# Patient Record
Sex: FEMALE | Race: WHITE
[De-identification: names, ages, dates, MRNs, and addresses within clinical notes are randomized per-mention and may not be internally consistent; named-entity substitution may affect disease eponyms.]

---

## 2018-04-19 ENCOUNTER — HOSPITAL ENCOUNTER (INPATIENT)
Dept: HOSPITAL 91 - E/R | Age: 48
LOS: 6 days | Discharge: HOME | DRG: 444 | End: 2018-04-25
Payer: MEDICAID

## 2018-04-19 DIAGNOSIS — D61.818: ICD-10-CM

## 2018-04-19 DIAGNOSIS — L03.115: ICD-10-CM

## 2018-04-19 DIAGNOSIS — B19.20: ICD-10-CM

## 2018-04-19 DIAGNOSIS — K81.0: Primary | ICD-10-CM

## 2018-04-19 DIAGNOSIS — Z59.0: ICD-10-CM

## 2018-04-19 DIAGNOSIS — J45.901: ICD-10-CM

## 2018-04-19 DIAGNOSIS — E66.01: ICD-10-CM

## 2018-04-19 DIAGNOSIS — K65.2: ICD-10-CM

## 2018-04-19 DIAGNOSIS — G47.30: ICD-10-CM

## 2018-04-19 DIAGNOSIS — F11.20: ICD-10-CM

## 2018-04-19 DIAGNOSIS — Z76.5: ICD-10-CM

## 2018-04-19 DIAGNOSIS — L03.116: ICD-10-CM

## 2018-04-19 DIAGNOSIS — Z86.718: ICD-10-CM

## 2018-04-19 DIAGNOSIS — I89.0: ICD-10-CM

## 2018-04-19 DIAGNOSIS — K74.60: ICD-10-CM

## 2018-04-19 DIAGNOSIS — I10: ICD-10-CM

## 2018-04-19 LAB
ABNORMAL IP MESSAGE: 1
ADD MAN DIFF?: YES
ALANINE AMINOTRANSFERASE: 18 IU/L (ref 13–69)
ALBUMIN/GLOBULIN RATIO: 1.24
ALBUMIN: 3.1 G/DL (ref 3.3–4.9)
ALKALINE PHOSPHATASE: 62 IU/L (ref 42–121)
ANION GAP: 11 (ref 8–16)
ANISOCYTOSIS: (no result) (ref 0–0)
ASPARTATE AMINO TRANSFERASE: 13 IU/L (ref 15–46)
BAND NEUTROPHILS #M: 0 10^3/UL (ref 0–0.6)
BAND NEUTROPHILS % (M): 2 % (ref 0–4)
BILIRUBIN,DIRECT: 0 MG/DL (ref 0–0.2)
BILIRUBIN,TOTAL: 0.5 MG/DL (ref 0.2–1.3)
BLOOD UREA NITROGEN: 20 MG/DL (ref 7–20)
CALCIUM: 8.9 MG/DL (ref 8.4–10.2)
CARBON DIOXIDE: 27 MMOL/L (ref 21–31)
CHLORIDE: 106 MMOL/L (ref 97–110)
CREATININE: 0.76 MG/DL (ref 0.44–1)
GIANT THROMBO% (M): 2 % (ref 0–0)
GLOBULIN: 2.5 G/DL (ref 1.3–3.2)
GLUCOSE: 112 MG/DL (ref 70–220)
HEMATOCRIT: 32.5 % (ref 37–47)
HEMOGLOBIN: 10.3 G/DL (ref 12–16)
LYMPHOCYTES #M: 0.1 10^3/UL (ref 0.8–2.9)
LYMPHOCYTES % (M): 7 % (ref 15–51)
MEAN CORPUSCULAR HEMOGLOBIN: 25.5 PG (ref 29–33)
MEAN CORPUSCULAR HGB CONC: 31.7 G/DL (ref 32–37)
MEAN CORPUSCULAR VOLUME: 80.4 FL (ref 82–101)
MICROCYTOSIS: (no result) (ref 0–0)
MONOCYTE #M: 0.2 10^3/UL (ref 0.3–0.9)
MONOCYTES % (M): 10 % (ref 0–11)
NUCLEATED RED BLOOD CELLS%: 0 /100WBC (ref 0–0)
OVALOCYTES: (no result) (ref 0–0)
PATH REVIEW?: (no result)
PLATELET COUNT: 39 10^3/UL (ref 140–415)
PLATELET ESTIMATE: (no result)
POIKILOCYTOSIS: (no result) (ref 0–0)
POSITIVE DIFF: (no result)
POTASSIUM: 3.7 MMOL/L (ref 3.5–5.1)
RED BLOOD COUNT: 4.04 10^6/UL (ref 4.2–5.4)
RED CELL DISTRIBUTION WIDTH: 14.6 % (ref 11.5–14.5)
SEG NEUT #M: 1.7 10^3/UL (ref 1.6–7.5)
SEGMENTED NEUTROPHILS (M) %: 81 % (ref 39–77)
SMUDGE%M: 3 % (ref 0–0)
SODIUM: 140 MMOL/L (ref 135–144)
TOTAL PROTEIN: 5.6 G/DL (ref 6.1–8.1)
WHITE BLOOD COUNT: 2.1 10^3/UL (ref 4.8–10.8)

## 2018-04-19 PROCEDURE — 87340 HEPATITIS B SURFACE AG IA: CPT

## 2018-04-19 PROCEDURE — 83605 ASSAY OF LACTIC ACID: CPT

## 2018-04-19 PROCEDURE — 83735 ASSAY OF MAGNESIUM: CPT

## 2018-04-19 PROCEDURE — 84100 ASSAY OF PHOSPHORUS: CPT

## 2018-04-19 PROCEDURE — 96374 THER/PROPH/DIAG INJ IV PUSH: CPT

## 2018-04-19 PROCEDURE — 86644 CMV ANTIBODY: CPT

## 2018-04-19 PROCEDURE — 86945 BLOOD PRODUCT/IRRADIATION: CPT

## 2018-04-19 PROCEDURE — 85025 COMPLETE CBC W/AUTO DIFF WBC: CPT

## 2018-04-19 PROCEDURE — 81025 URINE PREGNANCY TEST: CPT

## 2018-04-19 PROCEDURE — 81001 URINALYSIS AUTO W/SCOPE: CPT

## 2018-04-19 PROCEDURE — 36430 TRANSFUSION BLD/BLD COMPNT: CPT

## 2018-04-19 PROCEDURE — 76705 ECHO EXAM OF ABDOMEN: CPT

## 2018-04-19 PROCEDURE — 86703 HIV-1/HIV-2 1 RESULT ANTBDY: CPT

## 2018-04-19 PROCEDURE — 86901 BLOOD TYPING SEROLOGIC RH(D): CPT

## 2018-04-19 PROCEDURE — 85049 AUTOMATED PLATELET COUNT: CPT

## 2018-04-19 PROCEDURE — 85730 THROMBOPLASTIN TIME PARTIAL: CPT

## 2018-04-19 PROCEDURE — 85610 PROTHROMBIN TIME: CPT

## 2018-04-19 PROCEDURE — 80048 BASIC METABOLIC PNL TOTAL CA: CPT

## 2018-04-19 PROCEDURE — 83036 HEMOGLOBIN GLYCOSYLATED A1C: CPT

## 2018-04-19 PROCEDURE — 36569 INSJ PICC 5 YR+ W/O IMAGING: CPT

## 2018-04-19 PROCEDURE — 99285 EMERGENCY DEPT VISIT HI MDM: CPT

## 2018-04-19 PROCEDURE — 93970 EXTREMITY STUDY: CPT

## 2018-04-19 PROCEDURE — 80053 COMPREHEN METABOLIC PANEL: CPT

## 2018-04-19 PROCEDURE — 76937 US GUIDE VASCULAR ACCESS: CPT

## 2018-04-19 PROCEDURE — 86803 HEPATITIS C AB TEST: CPT

## 2018-04-19 PROCEDURE — 96375 TX/PRO/DX INJ NEW DRUG ADDON: CPT

## 2018-04-19 PROCEDURE — 86704 HEP B CORE ANTIBODY TOTAL: CPT

## 2018-04-19 PROCEDURE — 86709 HEPATITIS A IGM ANTIBODY: CPT

## 2018-04-19 PROCEDURE — 80061 LIPID PANEL: CPT

## 2018-04-19 PROCEDURE — 97163 PT EVAL HIGH COMPLEX 45 MIN: CPT

## 2018-04-19 PROCEDURE — 86900 BLOOD TYPING SEROLOGIC ABO: CPT

## 2018-04-19 PROCEDURE — 86850 RBC ANTIBODY SCREEN: CPT

## 2018-04-19 PROCEDURE — 87522 HEPATITIS C REVRS TRNSCRPJ: CPT

## 2018-04-19 PROCEDURE — 71045 X-RAY EXAM CHEST 1 VIEW: CPT

## 2018-04-19 PROCEDURE — 80202 ASSAY OF VANCOMYCIN: CPT

## 2018-04-19 PROCEDURE — 84443 ASSAY THYROID STIM HORMONE: CPT

## 2018-04-19 RX ADMIN — ONDANSETRON HYDROCHLORIDE 1 MG: 2 INJECTION, SOLUTION INTRAMUSCULAR; INTRAVENOUS at 20:49

## 2018-04-19 RX ADMIN — THIAMINE HYDROCHLORIDE 1 MLS/HR: 100 INJECTION, SOLUTION INTRAMUSCULAR; INTRAVENOUS at 20:49

## 2018-04-19 SDOH — ECONOMIC STABILITY - HOUSING INSECURITY: HOMELESSNESS: Z59.0

## 2018-04-20 ENCOUNTER — HOSPITAL ENCOUNTER (INPATIENT)
Age: 48
LOS: 5 days | Discharge: HOME | DRG: 444 | End: 2018-04-25

## 2018-04-20 LAB
ABNORMAL IP MESSAGE: 1
ADD MAN DIFF?: YES
ADD UMIC: YES
ALANINE AMINOTRANSFERASE: 23 IU/L (ref 13–69)
ALBUMIN/GLOBULIN RATIO: 1.17
ALBUMIN: 2.7 G/DL (ref 3.3–4.9)
ALKALINE PHOSPHATASE: 49 IU/L (ref 42–121)
ANION GAP: 11 (ref 8–16)
ANISOCYTOSIS: (no result) (ref 0–0)
ASPARTATE AMINO TRANSFERASE: 13 IU/L (ref 15–46)
BILIRUBIN,DIRECT: 0 MG/DL (ref 0–0.2)
BILIRUBIN,TOTAL: 0.6 MG/DL (ref 0.2–1.3)
BLOOD UREA NITROGEN: 18 MG/DL (ref 7–20)
CALCIUM: 8.4 MG/DL (ref 8.4–10.2)
CARBON DIOXIDE: 27 MMOL/L (ref 21–31)
CHLORIDE: 108 MMOL/L (ref 97–110)
CHOL/HDL RATIO: 2.5 RATIO
CHOLESTEROL: 74 MG/DL (ref 100–200)
CREATININE: 0.72 MG/DL (ref 0.44–1)
EOSINOPHILS % (M): 3 % (ref 0–7)
GIANT THROMBO% (M): 4 % (ref 0–0)
GLOBULIN: 2.3 G/DL (ref 1.3–3.2)
GLUCOSE: 119 MG/DL (ref 70–220)
HAAIG REFLEX: (no result)
HDL CHOLESTEROL: 29 MG/DL (ref 37–92)
HEMATOCRIT: 30.6 % (ref 37–47)
HEMOGLOBIN A1C: 5.4 % (ref 0–5.9)
HEMOGLOBIN: 9.5 G/DL (ref 12–16)
HEPATITIS B CORE ANTIBODY: NEGATIVE
HEPATITIS B SURFACE ANTIGEN: NEGATIVE
HEPATITIS C VIRAL ANTIBODY: REACTIVE
HIV 1&2 ANTIBODY: NEGATIVE
LACTIC ACID: 0.9 MMOL/L (ref 0.5–2)
LDL CHOLESTEROL,CALCULATED: 20 MG/DL
LYMPHOCYTES #M: 0.1 10^3/UL (ref 0.8–2.9)
LYMPHOCYTES % (M): 12 % (ref 15–51)
MAGNESIUM: 1.8 MG/DL (ref 1.7–2.5)
MEAN CORPUSCULAR HEMOGLOBIN: 25.6 PG (ref 29–33)
MEAN CORPUSCULAR HGB CONC: 31 G/DL (ref 32–37)
MEAN CORPUSCULAR VOLUME: 82.5 FL (ref 82–101)
MICROCYTOSIS: (no result) (ref 0–0)
MONOCYTE #M: 0 10^3/UL (ref 0.3–0.9)
MONOCYTES % (M): 2 % (ref 0–11)
NUCLEATED RED BLOOD CELLS%: 0 /100WBC (ref 0–0)
PLATELET COUNT: 38 10^3/UL (ref 140–415)
PLATELET ESTIMATE: (no result)
POIKILOCYTOSIS: (no result) (ref 0–0)
POLYCHROMASIA: (no result) (ref 0–0)
POSITIVE DIFF: (no result)
POTASSIUM: 3.6 MMOL/L (ref 3.5–5.1)
RED BLOOD COUNT: 3.71 10^6/UL (ref 4.2–5.4)
RED CELL DISTRIBUTION WIDTH: 14.6 % (ref 11.5–14.5)
SEGMENTED NEUTROPHILS (M) %: 83 % (ref 39–77)
SMUDGE%M: 6 % (ref 0–0)
SODIUM: 142 MMOL/L (ref 135–144)
THYROID STIMULATING HORMONE: 2.29 MIU/L (ref 0.47–4.68)
TOTAL PROTEIN: 5 G/DL (ref 6.1–8.1)
TRIGLYCERIDES: 123 MG/DL (ref 0–149)
UR ASCORBIC ACID: NEGATIVE MG/DL
UR BACTERIA: (no result) /HPF
UR BILIRUBIN (DIP): NEGATIVE MG/DL
UR BLOOD (DIP): NEGATIVE MG/DL
UR CLARITY: (no result)
UR COLOR: YELLOW
UR GLUCOSE (DIP): NEGATIVE MG/DL
UR KETONES (DIP): NEGATIVE MG/DL
UR LEUKOCYTE ESTERASE (DIP): NEGATIVE LEU/UL
UR MUCUS: (no result) /HPF
UR NITRITE (DIP): NEGATIVE MG/DL
UR PH (DIP): 5 (ref 5–9)
UR RBC: 1 /HPF (ref 0–5)
UR SPECIFIC GRAVITY (DIP): 1.02 (ref 1–1.03)
UR SQUAMOUS EPITHELIAL CELL: (no result) /HPF
UR TOTAL PROTEIN (DIP): (no result) MG/DL
UR UROBILINOGEN (DIP): NEGATIVE MG/DL
UR WBC: 3 /HPF (ref 0–5)
WHITE BLOOD COUNT: 1.2 10^3/UL (ref 4.8–10.8)

## 2018-04-20 PROCEDURE — 02HV33Z INSERTION OF INFUSION DEVICE INTO SUPERIOR VENA CAVA, PERCUTANEOUS APPROACH: ICD-10-PCS

## 2018-04-20 RX ADMIN — METRONIDAZOLE 1 MLS/HR: 500 SOLUTION INTRAVENOUS at 06:00

## 2018-04-20 RX ADMIN — LIDOCAINE HYDROCHLORIDE 1 ML: 10 INJECTION, SOLUTION EPIDURAL; INFILTRATION; INTRACAUDAL; PERINEURAL at 14:00

## 2018-04-20 RX ADMIN — HYDROMORPHONE HYDROCHLORIDE 1 MG: 1 INJECTION, SOLUTION INTRAMUSCULAR; INTRAVENOUS; SUBCUTANEOUS at 06:43

## 2018-04-20 RX ADMIN — CIPROFLOXACIN 1 MLS/HR: 2 INJECTION, SOLUTION INTRAVENOUS at 09:00

## 2018-04-20 RX ADMIN — VANCOMYCIN HYDROCHLORIDE 1 MLS/HR: 1 INJECTION, POWDER, LYOPHILIZED, FOR SOLUTION INTRAVENOUS at 19:07

## 2018-04-20 RX ADMIN — CIPROFLOXACIN 1 MLS/HR: 2 INJECTION, SOLUTION INTRAVENOUS at 00:39

## 2018-04-20 RX ADMIN — THIAMINE HYDROCHLORIDE 1 MLS/HR: 100 INJECTION, SOLUTION INTRAMUSCULAR; INTRAVENOUS at 01:01

## 2018-04-20 RX ADMIN — METRONIDAZOLE 1 MLS/HR: 500 SOLUTION INTRAVENOUS at 12:00

## 2018-04-20 RX ADMIN — HYDROMORPHONE HYDROCHLORIDE 1 MG: 1 INJECTION, SOLUTION INTRAMUSCULAR; INTRAVENOUS; SUBCUTANEOUS at 18:08

## 2018-04-20 RX ADMIN — METRONIDAZOLE 1 MLS/HR: 500 SOLUTION INTRAVENOUS at 00:30

## 2018-04-20 RX ADMIN — BENZOCAINE, MENTHOL 1 LOZENGE: 15; 3.6 LOZENGE ORAL at 23:52

## 2018-04-20 RX ADMIN — HYDROMORPHONE HYDROCHLORIDE 1 MG: 1 INJECTION, SOLUTION INTRAMUSCULAR; INTRAVENOUS; SUBCUTANEOUS at 23:52

## 2018-04-20 RX ADMIN — METRONIDAZOLE 1 MLS/HR: 500 SOLUTION INTRAVENOUS at 18:03

## 2018-04-20 RX ADMIN — THIAMINE HYDROCHLORIDE 1 MLS/HR: 100 INJECTION, SOLUTION INTRAMUSCULAR; INTRAVENOUS at 20:20

## 2018-04-20 RX ADMIN — CIPROFLOXACIN 1 MLS/HR: 2 INJECTION, SOLUTION INTRAVENOUS at 23:44

## 2018-04-20 RX ADMIN — THIAMINE HYDROCHLORIDE 1 MLS/HR: 100 INJECTION, SOLUTION INTRAMUSCULAR; INTRAVENOUS at 18:03

## 2018-04-20 RX ADMIN — METRONIDAZOLE 1 MLS/HR: 500 SOLUTION INTRAVENOUS at 06:41

## 2018-04-21 LAB
ADD MAN DIFF?: YES
ALANINE AMINOTRANSFERASE: 26 IU/L (ref 13–69)
ALBUMIN/GLOBULIN RATIO: 1.08
ALBUMIN: 2.5 G/DL (ref 3.3–4.9)
ALKALINE PHOSPHATASE: 46 IU/L (ref 42–121)
ANION GAP: 11 (ref 8–16)
ANISOCYTOSIS: (no result) (ref 0–0)
ASPARTATE AMINO TRANSFERASE: 14 IU/L (ref 15–46)
BAND NEUTROPHILS #M: 0 10^3/UL (ref 0–0.6)
BAND NEUTROPHILS % (M): 7 % (ref 0–4)
BILIRUBIN,DIRECT: 0 MG/DL (ref 0–0.2)
BILIRUBIN,TOTAL: 0.3 MG/DL (ref 0.2–1.3)
BLOOD UREA NITROGEN: 14 MG/DL (ref 7–20)
CALCIUM: 8.1 MG/DL (ref 8.4–10.2)
CARBON DIOXIDE: 28 MMOL/L (ref 21–31)
CHLORIDE: 109 MMOL/L (ref 97–110)
CREATININE: 0.6 MG/DL (ref 0.44–1)
EOSINOPHILS % (M): 2 % (ref 0–7)
GIANT THROMBO% (M): 18 % (ref 0–0)
GLOBULIN: 2.3 G/DL (ref 1.3–3.2)
GLUCOSE: 122 MG/DL (ref 70–220)
HCVA NOTE: (no result)
HEMATOCRIT: 27.8 % (ref 37–47)
HEMOGLOBIN: 8.5 G/DL (ref 12–16)
INR: 1.4
LYMPHOCYTES #M: 0.1 10^3/UL (ref 0.8–2.9)
LYMPHOCYTES % (M): 22 % (ref 15–51)
MEAN CORPUSCULAR HEMOGLOBIN: 25.4 PG (ref 29–33)
MEAN CORPUSCULAR HGB CONC: 30.6 G/DL (ref 32–37)
MEAN CORPUSCULAR VOLUME: 83 FL (ref 82–101)
MEAN PLATELET VOLUME: 12.6 FL (ref 7.4–10.4)
MICROCYTOSIS: (no result) (ref 0–0)
MONOCYTE #M: 0 10^3/UL (ref 0.3–0.9)
MONOCYTES % (M): 7 % (ref 0–11)
PARTIAL THROMBOPLASTIN TIME: 36.4 SEC (ref 25–35)
PLATELET COUNT: 25 10^3/UL (ref 140–415)
PLATELET ESTIMATE: (no result)
POLYCHROMASIA: (no result) (ref 0–0)
POTASSIUM: 3.6 MMOL/L (ref 3.5–5.1)
PROTIME: 17.4 SEC (ref 11.9–14.9)
PT RATIO: 1.4
REACTIVE LYMPHOCYTES #M: 0 10^3/UL (ref 0–0)
REACTIVE LYMPHOCYTES% (M): 2 % (ref 0–0)
RED BLOOD COUNT: 3.35 10^6/UL (ref 4.2–5.4)
RED CELL DISTRIBUTION WIDTH: 14.7 % (ref 11.5–14.5)
SEG NEUT #M: 0.4 10^3/UL (ref 1.6–7.5)
SEGMENTED NEUTROPHILS (M) %: 61 % (ref 39–77)
SMUDGE%M: 13 % (ref 0–0)
SODIUM: 144 MMOL/L (ref 135–144)
TOTAL PROTEIN: 4.8 G/DL (ref 6.1–8.1)
WHITE BLOOD COUNT: 0.6 10^3/UL (ref 4.8–10.8)

## 2018-04-21 RX ADMIN — CIPROFLOXACIN 1 MLS/HR: 2 INJECTION, SOLUTION INTRAVENOUS at 23:59

## 2018-04-21 RX ADMIN — HYDROMORPHONE HYDROCHLORIDE 1 MG: 1 INJECTION, SOLUTION INTRAMUSCULAR; INTRAVENOUS; SUBCUTANEOUS at 15:45

## 2018-04-21 RX ADMIN — METRONIDAZOLE 1 MLS/HR: 500 SOLUTION INTRAVENOUS at 05:45

## 2018-04-21 RX ADMIN — VANCOMYCIN HYDROCHLORIDE 1 MLS/HR: 1 INJECTION, POWDER, LYOPHILIZED, FOR SOLUTION INTRAVENOUS at 08:04

## 2018-04-21 RX ADMIN — HYDROMORPHONE HYDROCHLORIDE 1 MG: 1 INJECTION, SOLUTION INTRAMUSCULAR; INTRAVENOUS; SUBCUTANEOUS at 11:27

## 2018-04-21 RX ADMIN — CIPROFLOXACIN 1 MLS/HR: 2 INJECTION, SOLUTION INTRAVENOUS at 11:19

## 2018-04-21 RX ADMIN — THIAMINE HYDROCHLORIDE 1 MLS/HR: 100 INJECTION, SOLUTION INTRAMUSCULAR; INTRAVENOUS at 06:20

## 2018-04-21 RX ADMIN — METRONIDAZOLE 1 MLS/HR: 500 SOLUTION INTRAVENOUS at 19:05

## 2018-04-21 RX ADMIN — BENZOCAINE, MENTHOL 1 LOZENGE: 15; 3.6 LOZENGE ORAL at 20:53

## 2018-04-21 RX ADMIN — BENZOCAINE, MENTHOL 1 LOZENGE: 15; 3.6 LOZENGE ORAL at 11:26

## 2018-04-21 RX ADMIN — THIAMINE HYDROCHLORIDE 1 MLS/HR: 100 INJECTION, SOLUTION INTRAMUSCULAR; INTRAVENOUS at 17:31

## 2018-04-21 RX ADMIN — METRONIDAZOLE 1 MLS/HR: 500 SOLUTION INTRAVENOUS at 00:51

## 2018-04-21 RX ADMIN — HYDROMORPHONE HYDROCHLORIDE 1 MG: 1 INJECTION, SOLUTION INTRAMUSCULAR; INTRAVENOUS; SUBCUTANEOUS at 20:44

## 2018-04-21 RX ADMIN — VANCOMYCIN HYDROCHLORIDE 1 MLS/HR: 1 INJECTION, POWDER, LYOPHILIZED, FOR SOLUTION INTRAVENOUS at 20:44

## 2018-04-21 RX ADMIN — METRONIDAZOLE 1 MLS/HR: 500 SOLUTION INTRAVENOUS at 15:45

## 2018-04-22 LAB
ABNORMAL IP MESSAGE: 1
ADD MAN DIFF?: YES
ANION GAP: 13 (ref 8–16)
ANISOCYTOSIS: (no result) (ref 0–0)
BLOOD UREA NITROGEN: 13 MG/DL (ref 7–20)
CALCIUM: 8.4 MG/DL (ref 8.4–10.2)
CARBON DIOXIDE: 28 MMOL/L (ref 21–31)
CHLORIDE: 108 MMOL/L (ref 97–110)
CREATININE: 0.61 MG/DL (ref 0.44–1)
EOSINOPHILS % (M): 3 % (ref 0–7)
GIANT THROMBO% (M): 4 % (ref 0–0)
GLUCOSE: 130 MG/DL (ref 70–220)
HEMATOCRIT: 30.5 % (ref 37–47)
HEMOGLOBIN: 9.4 G/DL (ref 12–16)
LYMPHOCYTES #M: 0.1 10^3/UL (ref 0.8–2.9)
LYMPHOCYTES % (M): 19 % (ref 15–51)
MEAN CORPUSCULAR HEMOGLOBIN: 25.5 PG (ref 29–33)
MEAN CORPUSCULAR HGB CONC: 30.8 G/DL (ref 32–37)
MEAN CORPUSCULAR VOLUME: 82.9 FL (ref 82–101)
MEAN PLATELET VOLUME: 14.5 FL (ref 7.4–10.4)
MICROCYTOSIS: (no result) (ref 0–0)
MONOCYTE #M: 0 10^3/UL (ref 0.3–0.9)
MONOCYTES % (M): 5 % (ref 0–11)
NUCLEATED RED BLOOD CELLS%: 0 /100WBC (ref 0–0)
PLATELET COUNT: 33 10^3/UL (ref 140–415)
PLATELET ESTIMATE: (no result)
POIKILOCYTOSIS: (no result) (ref 0–0)
POSITIVE DIFF: (no result)
POTASSIUM: 3.9 MMOL/L (ref 3.5–5.1)
RED BLOOD COUNT: 3.68 10^6/UL (ref 4.2–5.4)
RED CELL DISTRIBUTION WIDTH: 14.7 % (ref 11.5–14.5)
SEGMENTED NEUTROPHILS (M) %: 73 % (ref 39–77)
SMUDGE%M: 1 % (ref 0–0)
SODIUM: 145 MMOL/L (ref 135–144)
TYPE AND SCREEN: 1 1
VANCOMYCIN,TROUGH: 9.1 UG/ML (ref 10–20)
WHITE BLOOD COUNT: 1 10^3/UL (ref 4.8–10.8)

## 2018-04-22 PROCEDURE — 30233R1 TRANSFUSION OF NONAUTOLOGOUS PLATELETS INTO PERIPHERAL VEIN, PERCUTANEOUS APPROACH: ICD-10-PCS

## 2018-04-22 RX ADMIN — HYDROMORPHONE HYDROCHLORIDE 1 MG: 1 INJECTION, SOLUTION INTRAMUSCULAR; INTRAVENOUS; SUBCUTANEOUS at 22:07

## 2018-04-22 RX ADMIN — HYDROMORPHONE HYDROCHLORIDE 1 MG: 1 INJECTION, SOLUTION INTRAMUSCULAR; INTRAVENOUS; SUBCUTANEOUS at 09:25

## 2018-04-22 RX ADMIN — METRONIDAZOLE 1 MLS/HR: 500 SOLUTION INTRAVENOUS at 18:36

## 2018-04-22 RX ADMIN — HYDROMORPHONE HYDROCHLORIDE 1 MG: 1 INJECTION, SOLUTION INTRAMUSCULAR; INTRAVENOUS; SUBCUTANEOUS at 18:36

## 2018-04-22 RX ADMIN — METRONIDAZOLE 1 MLS/HR: 500 SOLUTION INTRAVENOUS at 01:03

## 2018-04-22 RX ADMIN — THIAMINE HYDROCHLORIDE 1 MLS/HR: 100 INJECTION, SOLUTION INTRAMUSCULAR; INTRAVENOUS at 02:20

## 2018-04-22 RX ADMIN — HYDROMORPHONE HYDROCHLORIDE 1 MG: 1 INJECTION, SOLUTION INTRAMUSCULAR; INTRAVENOUS; SUBCUTANEOUS at 01:03

## 2018-04-22 RX ADMIN — BENZOCAINE, MENTHOL 1 LOZENGE: 15; 3.6 LOZENGE ORAL at 01:07

## 2018-04-22 RX ADMIN — METRONIDAZOLE 1 MLS/HR: 500 SOLUTION INTRAVENOUS at 05:34

## 2018-04-22 RX ADMIN — METRONIDAZOLE 1 MLS/HR: 500 SOLUTION INTRAVENOUS at 12:28

## 2018-04-22 RX ADMIN — BENZOCAINE, MENTHOL 1 LOZENGE: 15; 3.6 LOZENGE ORAL at 09:25

## 2018-04-22 RX ADMIN — CIPROFLOXACIN 1 MLS/HR: 2 INJECTION, SOLUTION INTRAVENOUS at 20:12

## 2018-04-22 RX ADMIN — HYDROMORPHONE HYDROCHLORIDE 1 MG: 1 INJECTION, SOLUTION INTRAMUSCULAR; INTRAVENOUS; SUBCUTANEOUS at 14:01

## 2018-04-22 RX ADMIN — VANCOMYCIN HYDROCHLORIDE 1 MLS/HR: 1 INJECTION, POWDER, LYOPHILIZED, FOR SOLUTION INTRAVENOUS at 09:18

## 2018-04-22 RX ADMIN — HYDROMORPHONE HYDROCHLORIDE 1 MG: 1 INJECTION, SOLUTION INTRAMUSCULAR; INTRAVENOUS; SUBCUTANEOUS at 05:34

## 2018-04-22 RX ADMIN — THIAMINE HYDROCHLORIDE 1 MLS/HR: 100 INJECTION, SOLUTION INTRAMUSCULAR; INTRAVENOUS at 12:20

## 2018-04-22 RX ADMIN — CIPROFLOXACIN 1 MLS/HR: 2 INJECTION, SOLUTION INTRAVENOUS at 09:18

## 2018-04-23 LAB
ABNORMAL IP MESSAGE: 1
ADD MAN DIFF?: YES
ANISOCYTOSIS: (no result) (ref 0–0)
BAND NEUTROPHILS #M: 0 10^3/UL (ref 0–0.6)
BAND NEUTROPHILS % (M): 6 % (ref 0–4)
EOSINOPHILS % (M): 2 % (ref 0–7)
HEMATOCRIT: 29.8 % (ref 37–47)
HEMOGLOBIN: 9 G/DL (ref 12–16)
HYPOCHROMASIA: (no result) (ref 0–0)
LYMPHOCYTES #M: 0.1 10^3/UL (ref 0.8–2.9)
LYMPHOCYTES % (M): 13 % (ref 15–51)
MEAN CORPUSCULAR HEMOGLOBIN: 25.2 PG (ref 29–33)
MEAN CORPUSCULAR HGB CONC: 30.2 G/DL (ref 32–37)
MEAN CORPUSCULAR VOLUME: 83.5 FL (ref 82–101)
MEAN PLATELET VOLUME: 12.3 FL (ref 7.4–10.4)
MICROCYTOSIS: (no result) (ref 0–0)
MONOCYTE #M: 0 10^3/UL (ref 0.3–0.9)
MONOCYTES % (M): 4 % (ref 0–11)
NUCLEATED RED BLOOD CELLS%: 0 /100WBC (ref 0–0)
PLATELET COUNT: 27 10^3/UL (ref 140–415)
PLATELET COUNT: 32 10^3/UL (ref 140–415)
PLATELET ESTIMATE: (no result)
POIKILOCYTOSIS: (no result) (ref 0–0)
POLYCHROMASIA: (no result) (ref 0–0)
POSITIVE DIFF: (no result)
RED BLOOD COUNT: 3.57 10^6/UL (ref 4.2–5.4)
RED CELL DISTRIBUTION WIDTH: 14.8 % (ref 11.5–14.5)
SEG NEUT #M: 0.8 10^3/UL (ref 1.6–7.5)
SEGMENTED NEUTROPHILS (M) %: 75 % (ref 39–77)
WHITE BLOOD COUNT: 1 10^3/UL (ref 4.8–10.8)

## 2018-04-23 RX ADMIN — CIPROFLOXACIN 1 MLS/HR: 2 INJECTION, SOLUTION INTRAVENOUS at 09:33

## 2018-04-23 RX ADMIN — HYDROMORPHONE HYDROCHLORIDE 1 MG: 1 INJECTION, SOLUTION INTRAMUSCULAR; INTRAVENOUS; SUBCUTANEOUS at 18:55

## 2018-04-23 RX ADMIN — METRONIDAZOLE 1 MLS/HR: 500 SOLUTION INTRAVENOUS at 13:01

## 2018-04-23 RX ADMIN — CIPROFLOXACIN 1 MLS/HR: 2 INJECTION, SOLUTION INTRAVENOUS at 21:31

## 2018-04-23 RX ADMIN — HYDROMORPHONE HYDROCHLORIDE 1 MG: 1 INJECTION, SOLUTION INTRAMUSCULAR; INTRAVENOUS; SUBCUTANEOUS at 10:50

## 2018-04-23 RX ADMIN — METRONIDAZOLE 1 MLS/HR: 500 SOLUTION INTRAVENOUS at 18:55

## 2018-04-23 RX ADMIN — METRONIDAZOLE 1 MLS/HR: 500 SOLUTION INTRAVENOUS at 05:45

## 2018-04-23 RX ADMIN — BARIUM SULFATE 1 ML: 21 SUSPENSION ORAL at 17:30

## 2018-04-23 RX ADMIN — HYDROMORPHONE HYDROCHLORIDE 1 MG: 1 INJECTION, SOLUTION INTRAMUSCULAR; INTRAVENOUS; SUBCUTANEOUS at 21:57

## 2018-04-23 RX ADMIN — HYDROMORPHONE HYDROCHLORIDE 1 MG: 1 INJECTION, SOLUTION INTRAMUSCULAR; INTRAVENOUS; SUBCUTANEOUS at 02:29

## 2018-04-23 RX ADMIN — METRONIDAZOLE 1 MLS/HR: 500 SOLUTION INTRAVENOUS at 00:44

## 2018-04-23 RX ADMIN — HYDROMORPHONE HYDROCHLORIDE 1 MG: 1 INJECTION, SOLUTION INTRAMUSCULAR; INTRAVENOUS; SUBCUTANEOUS at 14:44

## 2018-04-23 RX ADMIN — HYDROMORPHONE HYDROCHLORIDE 1 MG: 1 INJECTION, SOLUTION INTRAMUSCULAR; INTRAVENOUS; SUBCUTANEOUS at 06:32

## 2018-04-24 LAB
ABNORMAL IP MESSAGE: 1
ADD MAN DIFF?: YES
ANION GAP: 12 (ref 8–16)
ANISOCYTOSIS: (no result) (ref 0–0)
BAND NEUTROPHILS #M: 0 10^3/UL (ref 0–0.6)
BAND NEUTROPHILS % (M): 6 % (ref 0–4)
BLOOD UREA NITROGEN: 16 MG/DL (ref 7–20)
CALCIUM: 8.8 MG/DL (ref 8.4–10.2)
CARBON DIOXIDE: 28 MMOL/L (ref 21–31)
CHLORIDE: 109 MMOL/L (ref 97–110)
CREATININE: 0.59 MG/DL (ref 0.44–1)
EOSINOPHILS % (M): 3 % (ref 0–7)
GIANT THROMBO% (M): 4 % (ref 0–0)
GLUCOSE: 172 MG/DL (ref 70–220)
HEMATOCRIT: 29.3 % (ref 37–47)
HEMOGLOBIN: 9 G/DL (ref 12–16)
LYMPHOCYTES #M: 0.1 10^3/UL (ref 0.8–2.9)
LYMPHOCYTES % (M): 14 % (ref 15–51)
MAGNESIUM: 1.6 MG/DL (ref 1.7–2.5)
MEAN CORPUSCULAR HEMOGLOBIN: 25.4 PG (ref 29–33)
MEAN CORPUSCULAR HGB CONC: 30.7 G/DL (ref 32–37)
MEAN CORPUSCULAR VOLUME: 82.8 FL (ref 82–101)
MEAN PLATELET VOLUME: 13.2 FL (ref 7.4–10.4)
MICROCYTOSIS: (no result) (ref 0–0)
MONOCYTE #M: 0 10^3/UL (ref 0.3–0.9)
MONOCYTES % (M): 9 % (ref 0–11)
NUCLEATED RED BLOOD CELLS #: 0 10^3/UL (ref 0–0)
NUCLEATED RED BLOOD CELLS%: 0 /100WBC (ref 0–0)
PHOSPHORUS: 3.5 MG/DL (ref 2.5–4.9)
PLATELET COUNT: 32 10^3/UL (ref 140–415)
PLATELET ESTIMATE: (no result)
POIKILOCYTOSIS: (no result) (ref 0–0)
POLYCHROMASIA: (no result) (ref 0–0)
POSITIVE DIFF: (no result)
POTASSIUM: 4 MMOL/L (ref 3.5–5.1)
RED BLOOD COUNT: 3.54 10^6/UL (ref 4.2–5.4)
RED CELL DISTRIBUTION WIDTH: 15.1 % (ref 11.5–14.5)
SEG NEUT #M: 0.6 10^3/UL (ref 1.6–7.5)
SEGMENTED NEUTROPHILS (M) %: 67 % (ref 39–77)
SODIUM: 145 MMOL/L (ref 135–144)
TYPE AND SCREEN: 1 1
WHITE BLOOD COUNT: 0.9 10^3/UL (ref 4.8–10.8)

## 2018-04-24 RX ADMIN — HYDROMORPHONE HYDROCHLORIDE 1 MG: 1 INJECTION, SOLUTION INTRAMUSCULAR; INTRAVENOUS; SUBCUTANEOUS at 05:48

## 2018-04-24 RX ADMIN — BENZOCAINE, MENTHOL 1 LOZENGE: 15; 3.6 LOZENGE ORAL at 12:13

## 2018-04-24 RX ADMIN — HYDROMORPHONE HYDROCHLORIDE 1 MG: 1 INJECTION, SOLUTION INTRAMUSCULAR; INTRAVENOUS; SUBCUTANEOUS at 10:18

## 2018-04-24 RX ADMIN — BENZOCAINE, MENTHOL 1 LOZENGE: 15; 3.6 LOZENGE ORAL at 05:52

## 2018-04-24 RX ADMIN — BENZOCAINE, MENTHOL 1 LOZENGE: 15; 3.6 LOZENGE ORAL at 22:29

## 2018-04-24 RX ADMIN — METRONIDAZOLE 1 MLS/HR: 500 SOLUTION INTRAVENOUS at 12:13

## 2018-04-24 RX ADMIN — HYDROMORPHONE HYDROCHLORIDE 1 MG: 1 INJECTION, SOLUTION INTRAMUSCULAR; INTRAVENOUS; SUBCUTANEOUS at 01:42

## 2018-04-24 RX ADMIN — METRONIDAZOLE 1 MLS/HR: 500 SOLUTION INTRAVENOUS at 05:44

## 2018-04-24 RX ADMIN — CIPROFLOXACIN 1 MLS/HR: 2 INJECTION, SOLUTION INTRAVENOUS at 10:23

## 2018-04-24 RX ADMIN — HYDROMORPHONE HYDROCHLORIDE 1 MG: 1 INJECTION, SOLUTION INTRAMUSCULAR; INTRAVENOUS; SUBCUTANEOUS at 18:30

## 2018-04-24 RX ADMIN — HYDROMORPHONE HYDROCHLORIDE 1 MG: 1 INJECTION, SOLUTION INTRAMUSCULAR; INTRAVENOUS; SUBCUTANEOUS at 22:24

## 2018-04-24 RX ADMIN — METRONIDAZOLE 1 MLS/HR: 500 SOLUTION INTRAVENOUS at 23:26

## 2018-04-24 RX ADMIN — HYDROMORPHONE HYDROCHLORIDE 1 MG: 1 INJECTION, SOLUTION INTRAMUSCULAR; INTRAVENOUS; SUBCUTANEOUS at 14:25

## 2018-04-24 RX ADMIN — CIPROFLOXACIN 1 MLS/HR: 2 INJECTION, SOLUTION INTRAVENOUS at 20:27

## 2018-04-24 RX ADMIN — METRONIDAZOLE 1 MLS/HR: 500 SOLUTION INTRAVENOUS at 17:30

## 2018-04-24 RX ADMIN — METRONIDAZOLE 1 MLS/HR: 500 SOLUTION INTRAVENOUS at 01:42

## 2018-04-25 RX ADMIN — METRONIDAZOLE 1 MLS/HR: 500 SOLUTION INTRAVENOUS at 17:56

## 2018-04-25 RX ADMIN — METRONIDAZOLE 1 MLS/HR: 500 SOLUTION INTRAVENOUS at 12:09

## 2018-04-25 RX ADMIN — METRONIDAZOLE 1 MLS/HR: 500 SOLUTION INTRAVENOUS at 05:32

## 2018-04-25 RX ADMIN — BENZOCAINE, MENTHOL 1 LOZENGE: 15; 3.6 LOZENGE ORAL at 03:32

## 2018-04-25 RX ADMIN — CIPROFLOXACIN 1 MLS/HR: 2 INJECTION, SOLUTION INTRAVENOUS at 08:49

## 2018-04-25 RX ADMIN — BENZOCAINE, MENTHOL 1 LOZENGE: 15; 3.6 LOZENGE ORAL at 19:47

## 2018-04-25 RX ADMIN — METHADONE HYDROCHLORIDE 1 MG: 10 TABLET ORAL at 13:16

## 2018-04-25 RX ADMIN — HYDROMORPHONE HYDROCHLORIDE 1 MG: 1 INJECTION, SOLUTION INTRAMUSCULAR; INTRAVENOUS; SUBCUTANEOUS at 03:32

## 2018-04-25 RX ADMIN — BENZOCAINE, MENTHOL 1 LOZENGE: 15; 3.6 LOZENGE ORAL at 08:53

## 2018-04-25 RX ADMIN — HYDROMORPHONE HYDROCHLORIDE 1 MG: 1 INJECTION, SOLUTION INTRAMUSCULAR; INTRAVENOUS; SUBCUTANEOUS at 08:50

## 2020-05-22 ENCOUNTER — HOSPITAL ENCOUNTER (INPATIENT)
Dept: HOSPITAL 54 - MED | Age: 50
LOS: 9 days | Discharge: ANOTHER HOSPITAL | DRG: 690 | End: 2020-05-31
Attending: NURSE PRACTITIONER | Admitting: NURSE PRACTITIONER
Payer: MEDICAID

## 2020-05-22 VITALS — HEIGHT: 69 IN | WEIGHT: 293 LBS | BODY MASS INDEX: 43.4 KG/M2

## 2020-05-22 DIAGNOSIS — Z88.2: ICD-10-CM

## 2020-05-22 DIAGNOSIS — Z88.0: ICD-10-CM

## 2020-05-22 DIAGNOSIS — I10: ICD-10-CM

## 2020-05-22 DIAGNOSIS — R16.1: ICD-10-CM

## 2020-05-22 DIAGNOSIS — K76.0: ICD-10-CM

## 2020-05-22 DIAGNOSIS — D65: ICD-10-CM

## 2020-05-22 DIAGNOSIS — R73.03: ICD-10-CM

## 2020-05-22 DIAGNOSIS — F39: ICD-10-CM

## 2020-05-22 DIAGNOSIS — B49: ICD-10-CM

## 2020-05-22 DIAGNOSIS — C92.40: Primary | ICD-10-CM

## 2020-05-22 DIAGNOSIS — M81.0: ICD-10-CM

## 2020-05-22 DIAGNOSIS — K76.6: ICD-10-CM

## 2020-05-22 DIAGNOSIS — E66.01: ICD-10-CM

## 2020-05-22 DIAGNOSIS — N39.0: ICD-10-CM

## 2020-05-22 DIAGNOSIS — K59.00: ICD-10-CM

## 2020-05-22 DIAGNOSIS — G25.0: ICD-10-CM

## 2020-05-22 DIAGNOSIS — D61.818: ICD-10-CM

## 2020-05-22 DIAGNOSIS — G89.4: ICD-10-CM

## 2020-05-22 DIAGNOSIS — B96.89: ICD-10-CM

## 2020-05-22 DIAGNOSIS — E43: ICD-10-CM

## 2020-05-22 DIAGNOSIS — E87.6: ICD-10-CM

## 2020-05-22 PROCEDURE — P9012 CRYOPRECIPITATE EACH UNIT: HCPCS

## 2020-05-22 PROCEDURE — G0378 HOSPITAL OBSERVATION PER HR: HCPCS

## 2020-05-23 VITALS — SYSTOLIC BLOOD PRESSURE: 128 MMHG | DIASTOLIC BLOOD PRESSURE: 90 MMHG

## 2020-05-23 VITALS — SYSTOLIC BLOOD PRESSURE: 134 MMHG | DIASTOLIC BLOOD PRESSURE: 71 MMHG

## 2020-05-23 VITALS — SYSTOLIC BLOOD PRESSURE: 129 MMHG | DIASTOLIC BLOOD PRESSURE: 80 MMHG

## 2020-05-23 VITALS — DIASTOLIC BLOOD PRESSURE: 74 MMHG | SYSTOLIC BLOOD PRESSURE: 128 MMHG

## 2020-05-23 LAB
ALBUMIN SERPL BCP-MCNC: 3.3 G/DL (ref 3.4–5)
ALP SERPL-CCNC: 49 U/L (ref 46–116)
ALT SERPL W P-5'-P-CCNC: 21 U/L (ref 12–78)
AST SERPL W P-5'-P-CCNC: 17 U/L (ref 15–37)
BASOPHILS # BLD AUTO: 0 /CMM (ref 0–0.2)
BASOPHILS NFR BLD AUTO: 3 % (ref 0–2)
BILIRUB DIRECT SERPL-MCNC: 0.3 MG/DL (ref 0–0.2)
BILIRUB SERPL-MCNC: 0.9 MG/DL (ref 0.2–1)
BUN SERPL-MCNC: 19 MG/DL (ref 7–18)
CALCIUM SERPL-MCNC: 8.8 MG/DL (ref 8.5–10.1)
CHLORIDE SERPL-SCNC: 108 MMOL/L (ref 98–107)
CO2 SERPL-SCNC: 27 MMOL/L (ref 21–32)
CREAT SERPL-MCNC: 0.6 MG/DL (ref 0.6–1.3)
EOSINOPHIL NFR BLD AUTO: 0.6 % (ref 0–6)
FERRITIN SERPL-MCNC: 132 NG/ML (ref 8–388)
GLUCOSE SERPL-MCNC: 118 MG/DL (ref 74–106)
HCT VFR BLD AUTO: 33 % (ref 33–45)
HGB BLD-MCNC: 10.6 G/DL (ref 11.5–14.8)
IRON SERPL-MCNC: 38 UG/DL (ref 50–175)
LYMPHOCYTES NFR BLD AUTO: 0.2 /CMM (ref 0.8–4.8)
LYMPHOCYTES NFR BLD AUTO: 11.7 % (ref 20–44)
LYMPHOCYTES NFR BLD MANUAL: 13 % (ref 16–48)
MCHC RBC AUTO-ENTMCNC: 32 G/DL (ref 31–36)
MCV RBC AUTO: 87 FL (ref 82–100)
MONOCYTES NFR BLD AUTO: 0.1 /CMM (ref 0.1–1.3)
MONOCYTES NFR BLD AUTO: 9.2 % (ref 2–12)
MONOCYTES NFR BLD MANUAL: 10 % (ref 0–11)
NEUTROPHILS # BLD AUTO: 1.1 /CMM (ref 1.8–8.9)
NEUTROPHILS NFR BLD AUTO: 75.5 % (ref 43–81)
NEUTS SEG NFR BLD MANUAL: 77 % (ref 42–76)
PLATELET # BLD AUTO: 6 /CMM (ref 150–450)
POTASSIUM SERPL-SCNC: 3.4 MMOL/L (ref 3.5–5.1)
PROT SERPL-MCNC: 5.7 G/DL (ref 6.4–8.2)
RBC # BLD AUTO: 3.78 MIL/UL (ref 4–5.2)
SODIUM SERPL-SCNC: 144 MMOL/L (ref 136–145)
TIBC SERPL-MCNC: 208 UG/DL (ref 250–450)
WBC NRBC COR # BLD AUTO: 1.5 K/UL (ref 4.3–11)

## 2020-05-23 RX ADMIN — Medication PRN MG: at 09:18

## 2020-05-23 RX ADMIN — Medication PRN MG: at 13:13

## 2020-05-23 RX ADMIN — Medication PRN MG: at 05:19

## 2020-05-23 RX ADMIN — Medication PRN MG: at 21:55

## 2020-05-23 RX ADMIN — PROPRANOLOL HYDROCHLORIDE SCH MG: 40 TABLET ORAL at 21:54

## 2020-05-23 RX ADMIN — TOPIRAMATE SCH MG: 100 TABLET, COATED ORAL at 18:00

## 2020-05-23 RX ADMIN — PANTOPRAZOLE SODIUM SCH MG: 40 TABLET, DELAYED RELEASE ORAL at 09:18

## 2020-05-23 RX ADMIN — FOLIC ACID SCH MG: 1 TABLET ORAL at 21:54

## 2020-05-23 RX ADMIN — FENTANYL SCH MCG: 12 PATCH TRANSDERMAL at 18:00

## 2020-05-23 NOTE — NUR
RN MS NOTES

PT IN BED, AWAKE, ALERT AND ORIENTED, NO COMPLAINT AT THIS TIME, RESPIRATIONS NORMAL, PT 
HARD STICK, DR. CHAVIRA ORDERED MIDLINE, AWAITING PLACEMENT, ATE DINNER, TOLERATES WELL, PT 
FOR PLATELET TRANSFUSION, ALL NEEDS ATTENDED.

## 2020-05-23 NOTE — NUR
Admitted direct admit from [facility] via ambulance.  noted left black eye, states she 
did that to herself bumping her glasses.  bruises on her upper chest and sarah arms right and 
left.     large bag of medications , states some doctor ordered them and she is refusing to 
take them until she speaks in person to a MD.  Medication will be taken to the Pharm in the 
[facility].  She stated she takes a BP medication but does not know its name/.   
She is obeses and bedbound

## 2020-05-23 NOTE — NUR
AWAITING FOR PICC LINE NURSE AND PATIENT WOULD LIKE TO HAVE ANOTHER BED THAT IS MORE 
COMFORTABLE, THAT WAS REFUSED YESTERDAY, A BARIATRIC BED TO FOLLOW UP IN AM.

## 2020-05-23 NOTE — NUR
RN MS NOTES

PT IN BED, AWAKE, ALERT AND ORIENTED, NOT IN DISTRESS, PAIN MEDS GIVEN FOR ABDOMINAL PAIN, 
SEEN AND EXAMINED BY DR. CHAVIRA, PLAN OF CARE DISCUSSED WITH PT, VERBALIZED UNDERSTANDING.

## 2020-05-23 NOTE — NUR
RN MS NOTES

PT IN BED, AWAKE, ALERT AND ORIENTED, STILL WITH COMPLAINT OF ABDOMINAL PAIN, RESPIRATIONS 
NORMAL AND NOT LABORED, CALL LIGHT WITHIN REACH, NEEDS ATTENDED.

## 2020-05-23 NOTE — NUR
RN MS NOTES

PT STATED THAT SHE IS ALLERGIC TO CODEINE BUT SAID SHE IS OKAY TAKING MORPHINE, NO ADVERSE 
REACTION NOTED.

## 2020-05-23 NOTE — NUR
LVN/MED RECON



PATIENT OAX3, ABLE TO MAKE NEEDS KNOW. REPORTED NOT TAKING ANY MEDICATION. ADMITTED FROM 
[place] WITH HOME MEDICATION SUPPLY, PER PATIENT "I DON'T TAKE ANY MEDICATION, I WAS 
PRESCRIBED WITH THEM BUT I DON'T KNOW WHY? I NEVER SAW THE DOCTOR THAT PRESCRIBED THEM". 
PRIMARY RN AWARE.

## 2020-05-23 NOTE — NUR
MS/RN OPENING NOTES

RECEIVED PATIENT IN BED, AWAKE, ALERT X3 ABLE TO VERBALIZE NEEDS. DISCUSSED CARE. INFORMED 
REGARDING MIDLINE PROCEDURE AND TO ADMINISTER PLATELET ONCE CROSS MATCH WILL BE PROVIDED. 
BED LOCKED, CALL LIGHTS WITHIN REACH, BED TABLE WITHIN REACH. ON ROOM AIRE RESPIRATIONS EVEN 
AND UNLABORED, WILL MONITOR. RECEIVED ENDORSEMENT FROM AM RN FOR TORSTEN.

## 2020-05-23 NOTE — NUR
PATIENT HAD COVID TEST AT [place] WITH NEGATIVE RESULT RECENTLY. PATIENT REFUSED TO HAVE 
ANOTHER TEST AT THIS TIME,

## 2020-05-24 VITALS — DIASTOLIC BLOOD PRESSURE: 79 MMHG | SYSTOLIC BLOOD PRESSURE: 127 MMHG

## 2020-05-24 VITALS — DIASTOLIC BLOOD PRESSURE: 83 MMHG | SYSTOLIC BLOOD PRESSURE: 147 MMHG

## 2020-05-24 VITALS — SYSTOLIC BLOOD PRESSURE: 132 MMHG | DIASTOLIC BLOOD PRESSURE: 70 MMHG

## 2020-05-24 VITALS — DIASTOLIC BLOOD PRESSURE: 79 MMHG | SYSTOLIC BLOOD PRESSURE: 105 MMHG

## 2020-05-24 VITALS — SYSTOLIC BLOOD PRESSURE: 137 MMHG | DIASTOLIC BLOOD PRESSURE: 66 MMHG

## 2020-05-24 VITALS — SYSTOLIC BLOOD PRESSURE: 134 MMHG | DIASTOLIC BLOOD PRESSURE: 80 MMHG

## 2020-05-24 VITALS — DIASTOLIC BLOOD PRESSURE: 72 MMHG | SYSTOLIC BLOOD PRESSURE: 149 MMHG

## 2020-05-24 VITALS — SYSTOLIC BLOOD PRESSURE: 142 MMHG | DIASTOLIC BLOOD PRESSURE: 89 MMHG

## 2020-05-24 VITALS — SYSTOLIC BLOOD PRESSURE: 137 MMHG | DIASTOLIC BLOOD PRESSURE: 71 MMHG

## 2020-05-24 VITALS — SYSTOLIC BLOOD PRESSURE: 137 MMHG | DIASTOLIC BLOOD PRESSURE: 89 MMHG

## 2020-05-24 LAB
BASOPHILS # BLD AUTO: 0 /CMM (ref 0–0.2)
BASOPHILS NFR BLD AUTO: 0.3 % (ref 0–2)
BUN SERPL-MCNC: 16 MG/DL (ref 7–18)
CALCIUM SERPL-MCNC: 8.7 MG/DL (ref 8.5–10.1)
CHLORIDE SERPL-SCNC: 109 MMOL/L (ref 98–107)
CHOLEST SERPL-MCNC: 124 MG/DL (ref ?–200)
CO2 SERPL-SCNC: 27 MMOL/L (ref 21–32)
CREAT SERPL-MCNC: 0.6 MG/DL (ref 0.6–1.3)
D DIMER PPP FEU-MCNC: 4.32 MG/L(FEU (ref 0.17–0.5)
EOSINOPHIL NFR BLD AUTO: 0.5 % (ref 0–6)
GLUCOSE SERPL-MCNC: 121 MG/DL (ref 74–106)
HCT VFR BLD AUTO: 31 % (ref 33–45)
HDLC SERPL-MCNC: 37 MG/DL (ref 40–60)
HGB BLD-MCNC: 10.1 G/DL (ref 11.5–14.8)
LDLC SERPL DIRECT ASSAY-MCNC: 71 MG/DL (ref 0–99)
LYMPHOCYTES NFR BLD AUTO: 0.2 /CMM (ref 0.8–4.8)
LYMPHOCYTES NFR BLD AUTO: 14.6 % (ref 20–44)
LYMPHOCYTES NFR BLD MANUAL: 12 % (ref 16–48)
MAGNESIUM SERPL-MCNC: 2 MG/DL (ref 1.8–2.4)
MCHC RBC AUTO-ENTMCNC: 32 G/DL (ref 31–36)
MCV RBC AUTO: 86 FL (ref 82–100)
MONOCYTES NFR BLD AUTO: 0.1 /CMM (ref 0.1–1.3)
MONOCYTES NFR BLD AUTO: 7.7 % (ref 2–12)
MONOCYTES NFR BLD MANUAL: 8 % (ref 0–11)
NEUTROPHILS # BLD AUTO: 0.9 /CMM (ref 1.8–8.9)
NEUTROPHILS NFR BLD AUTO: 76.9 % (ref 43–81)
NEUTS SEG NFR BLD MANUAL: 80 % (ref 42–76)
PHOSPHATE SERPL-MCNC: 3.6 MG/DL (ref 2.5–4.9)
PLATELET # BLD AUTO: 7 /CMM (ref 150–450)
PLATELET # BLD AUTO: 7 /CMM (ref 150–450)
POTASSIUM SERPL-SCNC: 3.6 MMOL/L (ref 3.5–5.1)
RBC # BLD AUTO: 3.62 MIL/UL (ref 4–5.2)
SODIUM SERPL-SCNC: 143 MMOL/L (ref 136–145)
TRIGL SERPL-MCNC: 107 MG/DL (ref 30–150)
TSH SERPL DL<=0.005 MIU/L-ACNC: 1.2 UIU/ML (ref 0.36–3.74)
WBC NRBC COR # BLD AUTO: 1.2 K/UL (ref 4.3–11)

## 2020-05-24 PROCEDURE — 30233R1 TRANSFUSION OF NONAUTOLOGOUS PLATELETS INTO PERIPHERAL VEIN, PERCUTANEOUS APPROACH: ICD-10-PCS | Performed by: NURSE PRACTITIONER

## 2020-05-24 RX ADMIN — TOPIRAMATE SCH MG: 100 TABLET, COATED ORAL at 17:47

## 2020-05-24 RX ADMIN — Medication PRN MG: at 09:51

## 2020-05-24 RX ADMIN — PROPRANOLOL HYDROCHLORIDE SCH MG: 40 TABLET ORAL at 05:00

## 2020-05-24 RX ADMIN — Medication SCH MG: at 09:00

## 2020-05-24 RX ADMIN — PANTOPRAZOLE SODIUM SCH MG: 40 TABLET, DELAYED RELEASE ORAL at 09:51

## 2020-05-24 RX ADMIN — MUPIROCIN SCH GM: 20 OINTMENT TOPICAL at 20:50

## 2020-05-24 RX ADMIN — Medication PRN MG: at 22:23

## 2020-05-24 RX ADMIN — PROPRANOLOL HYDROCHLORIDE SCH MG: 40 TABLET ORAL at 13:00

## 2020-05-24 RX ADMIN — PROPRANOLOL HYDROCHLORIDE SCH MG: 40 TABLET ORAL at 20:50

## 2020-05-24 RX ADMIN — AZTREONAM SCH MLS/HR: 2 INJECTION, POWDER, FOR SOLUTION INTRAMUSCULAR; INTRAVENOUS at 17:43

## 2020-05-24 RX ADMIN — FOLIC ACID SCH MG: 1 TABLET ORAL at 09:00

## 2020-05-24 RX ADMIN — Medication PRN MG: at 04:11

## 2020-05-24 RX ADMIN — Medication PRN MG: at 17:54

## 2020-05-24 RX ADMIN — ALLOPURINOL SCH MG: 100 TABLET ORAL at 17:47

## 2020-05-24 NOTE — NUR
RN MS NOTES

PT AWAKE, ALERT AND ORIENTED, PAIN MEDICATION GIVEN FOR PAIN MANAGEMENT, NOT IN DISTRESS, 
STARTED PT ON IV ATB PER ORDER OF DR. DEWITT, PM CARE RENDERED, MOVED PT TO Counts include 234 beds at the [facility] BED, 
TOLERATED WELL, REPOSITIONED FOR COMFORT, PT REFUSED URINE SPECIMEN COLLECTION UNTIL AFTER 
SHE WAS TRANSFERRED TO THE Counts include 234 beds at the [facility] BED, ENDORSED CARE TO NIGHT SHIFT NURSE FOR CONTINUITY 
OF CARE.

## 2020-05-24 NOTE — NUR
MS/RN OPENING NOTES

RECEIVED PATIENT IN BED, ALERT, ORIENTED X3, ABLE TO VERBALIZE NEEDS, ON BARIATRIC BED, 
RESPIRATIONS EVEN AND UNLABORED, ON  CONTACT ISOLATION FOR MRSA NARES,NEUTRAPENIA PRECAUTION 
WITH LOW WBC. KEPT COMFORTABLE, PROVIDED SOME SNACKS, DISCUSSED PLAN OF CARE, WILL MONITOR. 
BED LOCKED, CALL LIGHTS WITHIN REACH. WILL ENDORSE TO AM RN FOR TORSTEN.

## 2020-05-24 NOTE — NUR
307-2 MS/RN NOTES

PATIENT ABLE TO SLEEP FEW HOURS, ALERT, ORIENTED AND ABLE TO MAKE NEEDS KNOWN. ATTENDED TO 
ALL NEEDS, BLOOD DRAWN AND PLATELET ORDER RECEIVED FOR TRANSFUSION , MIDLINE PLACED AND KEPT 
INTACT. BED LOCKED, BREATHING EVEN AND UNLABORED, WILL ENDORSE TO AM RN FOR TORSTEN.

## 2020-05-24 NOTE — NUR
MS/RN NOTES

PATIENT PLATELET BEING TRANSFUSED, AFTER BEING VERIFIED AND COSIGNED BY [place] GARCIA,PATIENT 
ALERT, ORIENTED X3, ABLE TO VERBALIZE NEEDS, EDUCATED AND INSTRUCTED ON INFECTION CONTROL. 
WILL MONITOR.

## 2020-05-24 NOTE — NUR
PREPARE PATIENT FOR PLATELET TRANSFUSION, ALERT, ORIENTED VITAL SIGNS RECORDED WNL. PATIENT 
PROVIDE INFORMATION ON PRE MEDICATION, AGREED FOR SOME TYLENOL AS ORDERED OF 650MG PO BUT 
REFUSED BENADRYL. DISCUSSED PROS AND CONS,VERBALIZED UNDERSTANDING. WILL MONITOR.

## 2020-05-24 NOTE — NUR
RN MS NOTES

PT IN BED, AWAKE, ALERT AND ORIENTED, PAIN MEDS GIVEN FOR PAIN MANAGEMENT, COMPLETED 2 UNITS 
OF PLATELETS, VITAL SIGNS WNL, TOLERATED WELL, CALL LIGHT WITHIN REACH.

## 2020-05-24 NOTE — NUR
PATIENT REQUESTED FOR PAIN MEDICATION FOR ABDOMINAL AND SHOULDER PAIN 8/10, MORPHINE 2 MG 
IVP REQUESTED BP WNL, ALERT, ORIENTED X3.

## 2020-05-24 NOTE — NUR
started to infuse one unit of platelet. check patient vital signs and cosigned by another RN 
Helen.will endorse to am rn  for ana.

## 2020-05-24 NOTE — NUR
RN MS NOTES

PT IN BED, ASLEEP, EASY TO AROUSE, ALERT AND ORIENTED, NO COMPLAINT AT THIS TIME, 
RESPIRATIONS NORMAL, CALL LIGHT WITHIN REACH, TRANSFUSING PLATELETS, TOLERATING WELL, NEEDS 
ATTENDED.

## 2020-05-25 VITALS — DIASTOLIC BLOOD PRESSURE: 83 MMHG | SYSTOLIC BLOOD PRESSURE: 143 MMHG

## 2020-05-25 VITALS — DIASTOLIC BLOOD PRESSURE: 77 MMHG | SYSTOLIC BLOOD PRESSURE: 139 MMHG

## 2020-05-25 VITALS — DIASTOLIC BLOOD PRESSURE: 63 MMHG | SYSTOLIC BLOOD PRESSURE: 123 MMHG

## 2020-05-25 VITALS — SYSTOLIC BLOOD PRESSURE: 128 MMHG | DIASTOLIC BLOOD PRESSURE: 76 MMHG

## 2020-05-25 VITALS — SYSTOLIC BLOOD PRESSURE: 130 MMHG | DIASTOLIC BLOOD PRESSURE: 74 MMHG

## 2020-05-25 VITALS — DIASTOLIC BLOOD PRESSURE: 76 MMHG | SYSTOLIC BLOOD PRESSURE: 128 MMHG

## 2020-05-25 VITALS — SYSTOLIC BLOOD PRESSURE: 135 MMHG | DIASTOLIC BLOOD PRESSURE: 68 MMHG

## 2020-05-25 VITALS — SYSTOLIC BLOOD PRESSURE: 131 MMHG | DIASTOLIC BLOOD PRESSURE: 74 MMHG

## 2020-05-25 VITALS — SYSTOLIC BLOOD PRESSURE: 125 MMHG | DIASTOLIC BLOOD PRESSURE: 65 MMHG

## 2020-05-25 VITALS — SYSTOLIC BLOOD PRESSURE: 129 MMHG | DIASTOLIC BLOOD PRESSURE: 78 MMHG

## 2020-05-25 VITALS — SYSTOLIC BLOOD PRESSURE: 137 MMHG | DIASTOLIC BLOOD PRESSURE: 84 MMHG

## 2020-05-25 VITALS — SYSTOLIC BLOOD PRESSURE: 131 MMHG | DIASTOLIC BLOOD PRESSURE: 70 MMHG

## 2020-05-25 VITALS — DIASTOLIC BLOOD PRESSURE: 62 MMHG | SYSTOLIC BLOOD PRESSURE: 132 MMHG

## 2020-05-25 LAB
ALBUMIN SERPL BCP-MCNC: 3 G/DL (ref 3.4–5)
ALP SERPL-CCNC: 47 U/L (ref 46–116)
ALT SERPL W P-5'-P-CCNC: 27 U/L (ref 12–78)
AST SERPL W P-5'-P-CCNC: 25 U/L (ref 15–37)
BASOPHILS # BLD AUTO: 0 /CMM (ref 0–0.2)
BASOPHILS NFR BLD AUTO: 0.5 % (ref 0–2)
BILIRUB SERPL-MCNC: 1 MG/DL (ref 0.2–1)
BUN SERPL-MCNC: 15 MG/DL (ref 7–18)
CALCIUM SERPL-MCNC: 8.4 MG/DL (ref 8.5–10.1)
CHLORIDE SERPL-SCNC: 110 MMOL/L (ref 98–107)
CO2 SERPL-SCNC: 27 MMOL/L (ref 21–32)
CREAT SERPL-MCNC: 0.7 MG/DL (ref 0.6–1.3)
D DIMER PPP FEU-MCNC: 4.99 MG/L(FEU (ref 0.17–0.5)
EOSINOPHIL NFR BLD AUTO: 0.9 % (ref 0–6)
GLUCOSE SERPL-MCNC: 103 MG/DL (ref 74–106)
HCT VFR BLD AUTO: 29 % (ref 33–45)
HEMOCCULT STL QL: NEGATIVE
HGB BLD-MCNC: 9.2 G/DL (ref 11.5–14.8)
LYMPHOCYTES NFR BLD AUTO: 0.1 /CMM (ref 0.8–4.8)
LYMPHOCYTES NFR BLD AUTO: 17.5 % (ref 20–44)
LYMPHOCYTES NFR BLD MANUAL: 22 % (ref 16–48)
MCHC RBC AUTO-ENTMCNC: 32 G/DL (ref 31–36)
MCV RBC AUTO: 86 FL (ref 82–100)
MONOCYTES NFR BLD AUTO: 0.1 /CMM (ref 0.1–1.3)
MONOCYTES NFR BLD AUTO: 8.6 % (ref 2–12)
NEUTROPHILS # BLD AUTO: 0.5 /CMM (ref 1.8–8.9)
NEUTROPHILS NFR BLD AUTO: 72.5 % (ref 43–81)
NEUTS BAND NFR BLD MANUAL: 2 % (ref 0–5)
NEUTS SEG NFR BLD MANUAL: 76 % (ref 42–76)
PLATELET # BLD AUTO: 10 /CMM (ref 150–450)
PLATELET # BLD AUTO: 9 /CMM (ref 150–450)
POTASSIUM SERPL-SCNC: 3.3 MMOL/L (ref 3.5–5.1)
PROT SERPL-MCNC: 5.4 G/DL (ref 6.4–8.2)
RBC # BLD AUTO: 3.3 MIL/UL (ref 4–5.2)
SODIUM SERPL-SCNC: 145 MMOL/L (ref 136–145)
WBC NRBC COR # BLD AUTO: 0.7 K/UL (ref 4.3–11)

## 2020-05-25 PROCEDURE — 30233M1 TRANSFUSION OF NONAUTOLOGOUS PLASMA CRYOPRECIPITATE INTO PERIPHERAL VEIN, PERCUTANEOUS APPROACH: ICD-10-PCS | Performed by: NURSE PRACTITIONER

## 2020-05-25 PROCEDURE — 30233K1 TRANSFUSION OF NONAUTOLOGOUS FROZEN PLASMA INTO PERIPHERAL VEIN, PERCUTANEOUS APPROACH: ICD-10-PCS | Performed by: NURSE PRACTITIONER

## 2020-05-25 RX ADMIN — PROPRANOLOL HYDROCHLORIDE SCH MG: 40 TABLET ORAL at 05:00

## 2020-05-25 RX ADMIN — PANTOPRAZOLE SODIUM SCH MG: 40 TABLET, DELAYED RELEASE ORAL at 08:16

## 2020-05-25 RX ADMIN — Medication SCH MG: at 08:16

## 2020-05-25 RX ADMIN — TBO-FILGRASTIM SCH MCG: 480 INJECTION, SOLUTION SUBCUTANEOUS at 13:54

## 2020-05-25 RX ADMIN — MUPIROCIN SCH GM: 20 OINTMENT TOPICAL at 08:29

## 2020-05-25 RX ADMIN — AZTREONAM SCH MLS/HR: 2 INJECTION, POWDER, FOR SOLUTION INTRAMUSCULAR; INTRAVENOUS at 16:46

## 2020-05-25 RX ADMIN — ALLOPURINOL SCH MG: 100 TABLET ORAL at 13:34

## 2020-05-25 RX ADMIN — MUPIROCIN SCH GM: 20 OINTMENT TOPICAL at 21:00

## 2020-05-25 RX ADMIN — PROPRANOLOL HYDROCHLORIDE SCH MG: 40 TABLET ORAL at 21:45

## 2020-05-25 RX ADMIN — AZTREONAM SCH MLS/HR: 2 INJECTION, POWDER, FOR SOLUTION INTRAMUSCULAR; INTRAVENOUS at 00:20

## 2020-05-25 RX ADMIN — BACLOFEN PRN MG: 10 TABLET ORAL at 21:45

## 2020-05-25 RX ADMIN — Medication PRN MG: at 10:35

## 2020-05-25 RX ADMIN — Medication PRN MG: at 21:44

## 2020-05-25 RX ADMIN — Medication PRN MG: at 04:58

## 2020-05-25 RX ADMIN — ALLOPURINOL SCH MG: 100 TABLET ORAL at 08:16

## 2020-05-25 RX ADMIN — TOPIRAMATE SCH MG: 100 TABLET, COATED ORAL at 17:38

## 2020-05-25 RX ADMIN — AZTREONAM SCH MLS/HR: 2 INJECTION, POWDER, FOR SOLUTION INTRAMUSCULAR; INTRAVENOUS at 08:16

## 2020-05-25 RX ADMIN — FOLIC ACID SCH MG: 1 TABLET ORAL at 08:16

## 2020-05-25 RX ADMIN — ALLOPURINOL SCH MG: 100 TABLET ORAL at 16:32

## 2020-05-25 RX ADMIN — PROPRANOLOL HYDROCHLORIDE SCH MG: 40 TABLET ORAL at 13:34

## 2020-05-25 RX ADMIN — Medication PRN MG: at 16:32

## 2020-05-25 RX ADMIN — DEXTROSE MONOHYDRATE SCH MLS/HR: 50 INJECTION, SOLUTION INTRAVENOUS at 13:57

## 2020-05-25 NOTE — NUR
MS RN NOTES



RECEIVED A CALL FROM JUAN AT [facility] [phone]. PER JUAN SHE WILL ASK MD IF THEY 
WILL ACCEPT PATIENT AND WILL INFORM OUR CM.

## 2020-05-25 NOTE — NUR
MS/RN NOTES PLATELET 1 UNIT TRANSFUSED WITH NO S/S OF REACTION. VITAL SIGNS CHECK WNL.TEMP 
AT 98.3. PULSE 82, O2 SAT IN ROOM AIR AT 95%, RES- PIRATION AT 19, B/P /64,

## 2020-05-25 NOTE — NUR
MS RN NOTES



ALERT AND ORIENTED X4. RESTING COMFORTABLY WATCHING TV IN BED. NO SOB. HOB ELEVATED. DENIES 
ANY C/O PAIN NOR DISCOMFORT AT THIS TIME. GRANIX GIVEN AS ORDERED DIANA WELL. 1ST BAG OF FFP 
GIVEN DIANA WELL (319ML), 1ST BAG OF CRYOPRECIPITATE (129 ML) INFUSING AT THIS TIME DIANA WELL 
ON WEST MIDLINE INTACT AND PATENT. BED IN LOWEST POSITION , LOCKED. BED ALARM ON. CALL LIGHT 
WITHIN REACH. ABLE TO VERBALIZE NEEDS.

## 2020-05-25 NOTE — NUR
MS RN NOTES



CALLED AND SPOKE TO SUZANNE RAM AT LAB REGARDING PLT, INFORMED KAIN THAT DR. DEWITT HAD 
ORDERED IT. PER KAIN, PLT PRODUCT IS NOT READY AND WILL BE RECEIVED FROM [facility].

## 2020-05-25 NOTE — NUR
MS/RN NOTES

RECEIVED LATEST PLATELET COUNT RESULT OF 9 REPORTED BY LAB SUZAN BURGER. TO FOLLOW UP 
INCOMING MD,AND ENDORSE TO AM RN FOR ANY NEW ORDER.

## 2020-05-25 NOTE — NUR
307-2 MS/RNNOTES

PATIENT SLEPT FEW HOURS, WATCHED TV FOR DISTRACTION. ON PAIN MANAGEMENT MONITORING. IV 
ANTIBIOTIC INFUSED, PLATELET ONE UNIT TRANSFUSED WITH NO S/S OF ADVERSE REACTION,  ON ROOM 
AIR WITH OXYGENATION LEVEL OF 95%. ATTENDED TO ALL NEEDS, WILL ENDORSE TO AM RN FOR TORSTEN.

## 2020-05-25 NOTE — NUR
MS/RN NOTES

PATIENT REQUESTED FOR PAIN MEDICATION IVP SLOW PICH OF 2MG DUE TO PAIN AND WITH BLOOD 
PRESSURE CHECK. REQUESTED NOT TO HAVE SCHEDULED MED AT 0500 FOR HTN BE GIVEN. AT THIS TIME 
AND WAIT FOR THE AM AT 9AM .

## 2020-05-25 NOTE — NUR
BLOOD BANK CONTACTED REGARDING PLASMA REQUEST AND TO BE PREPARED AND TO CALL ONCE READY. TO 
ENDORSE TO AM [place] .

## 2020-05-25 NOTE — NUR
MS/RN NOTES

PATIENT REFUSED TO HAVE ANOTHER PHOTO PICTURES TO BE TAKEN ON HER BRUISES, REPORTED THAT IT 
WAS PHOTOGRAPH ALREADY. MADE AWARE  AND INSIST THAT SHE WANTED NO PICTURES BE TAKEN,

## 2020-05-26 VITALS — DIASTOLIC BLOOD PRESSURE: 70 MMHG | SYSTOLIC BLOOD PRESSURE: 133 MMHG

## 2020-05-26 VITALS — DIASTOLIC BLOOD PRESSURE: 67 MMHG | SYSTOLIC BLOOD PRESSURE: 130 MMHG

## 2020-05-26 VITALS — SYSTOLIC BLOOD PRESSURE: 144 MMHG | DIASTOLIC BLOOD PRESSURE: 76 MMHG

## 2020-05-26 VITALS — DIASTOLIC BLOOD PRESSURE: 86 MMHG | SYSTOLIC BLOOD PRESSURE: 152 MMHG

## 2020-05-26 VITALS — SYSTOLIC BLOOD PRESSURE: 152 MMHG | DIASTOLIC BLOOD PRESSURE: 84 MMHG

## 2020-05-26 VITALS — DIASTOLIC BLOOD PRESSURE: 72 MMHG | SYSTOLIC BLOOD PRESSURE: 134 MMHG

## 2020-05-26 VITALS — DIASTOLIC BLOOD PRESSURE: 74 MMHG | SYSTOLIC BLOOD PRESSURE: 131 MMHG

## 2020-05-26 VITALS — SYSTOLIC BLOOD PRESSURE: 130 MMHG | DIASTOLIC BLOOD PRESSURE: 67 MMHG

## 2020-05-26 VITALS — SYSTOLIC BLOOD PRESSURE: 138 MMHG | DIASTOLIC BLOOD PRESSURE: 79 MMHG

## 2020-05-26 VITALS — DIASTOLIC BLOOD PRESSURE: 77 MMHG | SYSTOLIC BLOOD PRESSURE: 139 MMHG

## 2020-05-26 LAB
ALBUMIN SERPL BCP-MCNC: 2.9 G/DL (ref 3.4–5)
ALP SERPL-CCNC: 47 U/L (ref 46–116)
ALT SERPL W P-5'-P-CCNC: 32 U/L (ref 12–78)
AST SERPL W P-5'-P-CCNC: 25 U/L (ref 15–37)
BASOPHILS # BLD AUTO: 0 /CMM (ref 0–0.2)
BASOPHILS NFR BLD AUTO: 0.2 % (ref 0–2)
BILIRUB SERPL-MCNC: 1 MG/DL (ref 0.2–1)
BUN SERPL-MCNC: 12 MG/DL (ref 7–18)
CALCIUM SERPL-MCNC: 7.8 MG/DL (ref 8.5–10.1)
CHLORIDE SERPL-SCNC: 110 MMOL/L (ref 98–107)
CO2 SERPL-SCNC: 27 MMOL/L (ref 21–32)
CREAT SERPL-MCNC: 0.7 MG/DL (ref 0.6–1.3)
D DIMER PPP FEU-MCNC: 3.75 MG/L(FEU (ref 0.17–0.5)
EOSINOPHIL NFR BLD AUTO: 0.9 % (ref 0–6)
EOSINOPHIL NFR BLD MANUAL: 1 % (ref 0–4)
GLUCOSE SERPL-MCNC: 115 MG/DL (ref 74–106)
HCT VFR BLD AUTO: 28 % (ref 33–45)
HGB BLD-MCNC: 9 G/DL (ref 11.5–14.8)
LYMPHOCYTES NFR BLD AUTO: 0.2 /CMM (ref 0.8–4.8)
LYMPHOCYTES NFR BLD AUTO: 8.4 % (ref 20–44)
LYMPHOCYTES NFR BLD MANUAL: 8 % (ref 16–48)
MCHC RBC AUTO-ENTMCNC: 32 G/DL (ref 31–36)
MCV RBC AUTO: 87 FL (ref 82–100)
MONOCYTES NFR BLD AUTO: 0.2 /CMM (ref 0.1–1.3)
MONOCYTES NFR BLD AUTO: 7.6 % (ref 2–12)
MONOCYTES NFR BLD MANUAL: 4 % (ref 0–11)
NEUTROPHILS # BLD AUTO: 1.9 /CMM (ref 1.8–8.9)
NEUTROPHILS NFR BLD AUTO: 82.9 % (ref 43–81)
NEUTS BAND NFR BLD MANUAL: 4 % (ref 0–5)
NEUTS SEG NFR BLD MANUAL: 83 % (ref 42–76)
PLATELET # BLD AUTO: 12 /CMM (ref 150–450)
PLATELET # BLD AUTO: 12 /CMM (ref 150–450)
POTASSIUM SERPL-SCNC: 3.2 MMOL/L (ref 3.5–5.1)
PROT SERPL-MCNC: 5.3 G/DL (ref 6.4–8.2)
RBC # BLD AUTO: 3.24 MIL/UL (ref 4–5.2)
SODIUM SERPL-SCNC: 144 MMOL/L (ref 136–145)
WBC NRBC COR # BLD AUTO: 2.3 K/UL (ref 4.3–11)

## 2020-05-26 RX ADMIN — PANTOPRAZOLE SODIUM SCH MG: 40 TABLET, DELAYED RELEASE ORAL at 09:19

## 2020-05-26 RX ADMIN — ACETAMINOPHEN PRN MG: 325 TABLET ORAL at 06:08

## 2020-05-26 RX ADMIN — DEXTROSE MONOHYDRATE SCH MLS/HR: 50 INJECTION, SOLUTION INTRAVENOUS at 02:51

## 2020-05-26 RX ADMIN — AZTREONAM SCH MLS/HR: 2 INJECTION, POWDER, FOR SOLUTION INTRAMUSCULAR; INTRAVENOUS at 17:14

## 2020-05-26 RX ADMIN — Medication SCH MG: at 09:07

## 2020-05-26 RX ADMIN — Medication PRN MG: at 09:34

## 2020-05-26 RX ADMIN — FENTANYL SCH MCG: 12 PATCH TRANSDERMAL at 17:18

## 2020-05-26 RX ADMIN — Medication SCH MG: at 09:18

## 2020-05-26 RX ADMIN — DEXTROSE MONOHYDRATE SCH MLS/HR: 50 INJECTION, SOLUTION INTRAVENOUS at 22:11

## 2020-05-26 RX ADMIN — FENTANYL SCH MCG: 12 PATCH TRANSDERMAL at 17:15

## 2020-05-26 RX ADMIN — MUPIROCIN SCH APP: 20 OINTMENT TOPICAL at 20:07

## 2020-05-26 RX ADMIN — DEXTROSE MONOHYDRATE SCH MLS/HR: 50 INJECTION, SOLUTION INTRAVENOUS at 14:16

## 2020-05-26 RX ADMIN — ALLOPURINOL SCH MG: 100 TABLET ORAL at 17:15

## 2020-05-26 RX ADMIN — PROPRANOLOL HYDROCHLORIDE SCH MG: 40 TABLET ORAL at 14:21

## 2020-05-26 RX ADMIN — TOPIRAMATE SCH MG: 100 TABLET, COATED ORAL at 17:15

## 2020-05-26 RX ADMIN — PROPRANOLOL HYDROCHLORIDE SCH MG: 40 TABLET ORAL at 05:28

## 2020-05-26 RX ADMIN — TBO-FILGRASTIM SCH MCG: 480 INJECTION, SOLUTION SUBCUTANEOUS at 14:53

## 2020-05-26 RX ADMIN — MUPIROCIN SCH APP: 20 OINTMENT TOPICAL at 09:31

## 2020-05-26 RX ADMIN — ALLOPURINOL SCH MG: 100 TABLET ORAL at 09:17

## 2020-05-26 RX ADMIN — AZTREONAM SCH MLS/HR: 2 INJECTION, POWDER, FOR SOLUTION INTRAMUSCULAR; INTRAVENOUS at 23:33

## 2020-05-26 RX ADMIN — ALLOPURINOL SCH MG: 100 TABLET ORAL at 14:16

## 2020-05-26 RX ADMIN — Medication PRN MG: at 18:39

## 2020-05-26 RX ADMIN — AZTREONAM SCH MLS/HR: 2 INJECTION, POWDER, FOR SOLUTION INTRAMUSCULAR; INTRAVENOUS at 09:21

## 2020-05-26 RX ADMIN — Medication PRN MG: at 22:20

## 2020-05-26 RX ADMIN — BACLOFEN PRN MG: 10 TABLET ORAL at 20:08

## 2020-05-26 RX ADMIN — FOLIC ACID SCH MG: 1 TABLET ORAL at 09:06

## 2020-05-26 RX ADMIN — PROPRANOLOL HYDROCHLORIDE SCH MG: 40 TABLET ORAL at 20:10

## 2020-05-26 RX ADMIN — Medication PRN MG: at 03:00

## 2020-05-26 RX ADMIN — Medication PRN MG: at 14:16

## 2020-05-26 RX ADMIN — AZTREONAM SCH MLS/HR: 2 INJECTION, POWDER, FOR SOLUTION INTRAMUSCULAR; INTRAVENOUS at 00:19

## 2020-05-26 NOTE — NUR
BLOOD COMPONENT ADMINISTRATION.

attempted to hang cryoprecipatate at 0015 and computer would not allow documentation. 

0020 called blood BANK AND BLOOD BANK TECH UNAVAILABLE. 

0030 RETURNED TO BLOOD BANK FOUND BLOOD BANK TECH AND CLARIFIED CRYOPRECIPITATE, APPARENTLY 
BOOKER NOT CLOSED PROPERLY WHICH DID NOT RELEASE CRYO IN SYSTEM. REVIEWED EXPERATION OF CRYO 
TIME AND ABILITY TO STORE AT ROOM TEMP WILL TRANSFUSE AFTER ANTIBIOTIC INFUSED. 

-------------------------------------------------------------------------------

Addendum: 05/26/20 at 7785 by NATALIYA SOTO RN

-------------------------------------------------------------------------------

NOTE ALREADY WRITTEN. WRITTEN IN ERROR.

## 2020-05-26 NOTE — NUR
ms rn

patient was cleaned, urinated already, was not able to collect urine, will staright cath but 
definitely refused it.

## 2020-05-26 NOTE — NUR
ms rn

received on bed, awake,alert,oriented x4,not in any form of distress, respirations even and 
unlabored,no sob noted, plasma bag just finished from night shift, called blood bank for 
clarification of plasma to be given 1 bag more, but was told that it was completed already 
andday shift rn gave the first one, cn notified and made aware.

## 2020-05-26 NOTE — NUR
cryoprecipatate would not allowed to be documented on patient record. call made to blood 
bank state blood bank will call back. 



0025 visitied blood bank issue of cryoprecipatate reviewed with soumya astudillo blood 
technician. revewed expeiration time.



0140 cryoprecipate started to infuse with new tubing.  before timed expiration.  barcode 
overridden per soumya astudillo blood technician bar code for cryoprecipatate not available.

## 2020-05-26 NOTE — NUR
ms rn

offer staright catheter to collect urine, patient refused. was told to have bed pan when she 
urinate.

## 2020-05-27 VITALS — DIASTOLIC BLOOD PRESSURE: 79 MMHG | SYSTOLIC BLOOD PRESSURE: 128 MMHG

## 2020-05-27 VITALS — SYSTOLIC BLOOD PRESSURE: 126 MMHG | DIASTOLIC BLOOD PRESSURE: 88 MMHG

## 2020-05-27 VITALS — SYSTOLIC BLOOD PRESSURE: 128 MMHG | DIASTOLIC BLOOD PRESSURE: 79 MMHG

## 2020-05-27 VITALS — DIASTOLIC BLOOD PRESSURE: 68 MMHG | SYSTOLIC BLOOD PRESSURE: 126 MMHG

## 2020-05-27 VITALS — DIASTOLIC BLOOD PRESSURE: 74 MMHG | SYSTOLIC BLOOD PRESSURE: 140 MMHG

## 2020-05-27 VITALS — DIASTOLIC BLOOD PRESSURE: 79 MMHG | SYSTOLIC BLOOD PRESSURE: 137 MMHG

## 2020-05-27 VITALS — SYSTOLIC BLOOD PRESSURE: 124 MMHG | DIASTOLIC BLOOD PRESSURE: 76 MMHG

## 2020-05-27 LAB
ALBUMIN SERPL BCP-MCNC: 3.1 G/DL (ref 3.4–5)
ALP SERPL-CCNC: 58 U/L (ref 46–116)
ALT SERPL W P-5'-P-CCNC: 35 U/L (ref 12–78)
AST SERPL W P-5'-P-CCNC: 26 U/L (ref 15–37)
BASOPHILS # BLD AUTO: 0 /CMM (ref 0–0.2)
BASOPHILS NFR BLD AUTO: 0.1 % (ref 0–2)
BILIRUB SERPL-MCNC: 1.3 MG/DL (ref 0.2–1)
BUN SERPL-MCNC: 8 MG/DL (ref 7–18)
CALCIUM SERPL-MCNC: 8.5 MG/DL (ref 8.5–10.1)
CHLORIDE SERPL-SCNC: 108 MMOL/L (ref 98–107)
CO2 SERPL-SCNC: 29 MMOL/L (ref 21–32)
CREAT SERPL-MCNC: 0.6 MG/DL (ref 0.6–1.3)
D DIMER PPP FEU-MCNC: 3.57 MG/L(FEU (ref 0.17–0.5)
EOSINOPHIL NFR BLD AUTO: 0.9 % (ref 0–6)
EOSINOPHIL NFR BLD MANUAL: 1 % (ref 0–4)
GLUCOSE SERPL-MCNC: 134 MG/DL (ref 74–106)
HCT VFR BLD AUTO: 30 % (ref 33–45)
HGB BLD-MCNC: 9.8 G/DL (ref 11.5–14.8)
LYMPHOCYTES NFR BLD AUTO: 0.2 /CMM (ref 0.8–4.8)
LYMPHOCYTES NFR BLD AUTO: 6.7 % (ref 20–44)
LYMPHOCYTES NFR BLD MANUAL: 10 % (ref 16–48)
MAGNESIUM SERPL-MCNC: 1.5 MG/DL (ref 1.8–2.4)
MCHC RBC AUTO-ENTMCNC: 32 G/DL (ref 31–36)
MCV RBC AUTO: 88 FL (ref 82–100)
MONOCYTES NFR BLD AUTO: 0.2 /CMM (ref 0.1–1.3)
MONOCYTES NFR BLD AUTO: 8.3 % (ref 2–12)
MONOCYTES NFR BLD MANUAL: 8 % (ref 0–11)
NEUTROPHILS # BLD AUTO: 2.3 /CMM (ref 1.8–8.9)
NEUTROPHILS NFR BLD AUTO: 84 % (ref 43–81)
NEUTS BAND NFR BLD MANUAL: 5 % (ref 0–5)
NEUTS SEG NFR BLD MANUAL: 76 % (ref 42–76)
PHOSPHATE SERPL-MCNC: 2.6 MG/DL (ref 2.5–4.9)
PLATELET # BLD AUTO: 12 /CMM (ref 150–450)
PLATELET # BLD AUTO: 12 /CMM (ref 150–450)
POTASSIUM SERPL-SCNC: 3.4 MMOL/L (ref 3.5–5.1)
PROT SERPL-MCNC: 5.7 G/DL (ref 6.4–8.2)
RBC # BLD AUTO: 3.45 MIL/UL (ref 4–5.2)
SODIUM SERPL-SCNC: 142 MMOL/L (ref 136–145)
WBC NRBC COR # BLD AUTO: 2.8 K/UL (ref 4.3–11)

## 2020-05-27 RX ADMIN — MAGNESIUM SULFATE IN DEXTROSE SCH MLS/HR: 10 INJECTION, SOLUTION INTRAVENOUS at 13:22

## 2020-05-27 RX ADMIN — PROPRANOLOL HYDROCHLORIDE SCH MG: 40 TABLET ORAL at 22:02

## 2020-05-27 RX ADMIN — Medication SCH MG: at 08:57

## 2020-05-27 RX ADMIN — ACETAMINOPHEN PRN MG: 325 TABLET ORAL at 00:23

## 2020-05-27 RX ADMIN — Medication PRN MG: at 20:37

## 2020-05-27 RX ADMIN — TOPIRAMATE SCH MG: 100 TABLET, COATED ORAL at 18:27

## 2020-05-27 RX ADMIN — ALLOPURINOL SCH MG: 100 TABLET ORAL at 17:00

## 2020-05-27 RX ADMIN — DEXTROSE MONOHYDRATE SCH MLS/HR: 50 INJECTION, SOLUTION INTRAVENOUS at 22:02

## 2020-05-27 RX ADMIN — BACLOFEN PRN MG: 10 TABLET ORAL at 20:37

## 2020-05-27 RX ADMIN — Medication PRN MG: at 04:28

## 2020-05-27 RX ADMIN — ALLOPURINOL SCH MG: 100 TABLET ORAL at 18:27

## 2020-05-27 RX ADMIN — AZTREONAM SCH MLS/HR: 2 INJECTION, POWDER, FOR SOLUTION INTRAMUSCULAR; INTRAVENOUS at 18:43

## 2020-05-27 RX ADMIN — MUPIROCIN SCH APP: 20 OINTMENT TOPICAL at 09:00

## 2020-05-27 RX ADMIN — TOPIRAMATE SCH MG: 100 TABLET, COATED ORAL at 18:00

## 2020-05-27 RX ADMIN — ALLOPURINOL SCH MG: 100 TABLET ORAL at 08:57

## 2020-05-27 RX ADMIN — PANTOPRAZOLE SODIUM SCH MG: 40 TABLET, DELAYED RELEASE ORAL at 08:57

## 2020-05-27 RX ADMIN — PROPRANOLOL HYDROCHLORIDE SCH MG: 40 TABLET ORAL at 04:41

## 2020-05-27 RX ADMIN — PROPRANOLOL HYDROCHLORIDE SCH MG: 40 TABLET ORAL at 12:57

## 2020-05-27 RX ADMIN — DEXTROSE MONOHYDRATE SCH MLS/HR: 50 INJECTION, SOLUTION INTRAVENOUS at 05:21

## 2020-05-27 RX ADMIN — MAGNESIUM SULFATE IN DEXTROSE SCH MLS/HR: 10 INJECTION, SOLUTION INTRAVENOUS at 11:01

## 2020-05-27 RX ADMIN — TBO-FILGRASTIM SCH MCG: 480 INJECTION, SOLUTION SUBCUTANEOUS at 15:03

## 2020-05-27 RX ADMIN — MAGNESIUM SULFATE IN DEXTROSE SCH MLS/HR: 10 INJECTION, SOLUTION INTRAVENOUS at 11:59

## 2020-05-27 RX ADMIN — ALLOPURINOL SCH MG: 100 TABLET ORAL at 12:56

## 2020-05-27 RX ADMIN — Medication PRN MG: at 12:56

## 2020-05-27 RX ADMIN — AZTREONAM SCH MLS/HR: 2 INJECTION, POWDER, FOR SOLUTION INTRAMUSCULAR; INTRAVENOUS at 09:05

## 2020-05-27 RX ADMIN — MUPIROCIN SCH APP: 20 OINTMENT TOPICAL at 22:03

## 2020-05-27 RX ADMIN — FOLIC ACID SCH MG: 1 TABLET ORAL at 08:57

## 2020-05-27 RX ADMIN — DEXTROSE MONOHYDRATE SCH MLS/HR: 50 INJECTION, SOLUTION INTRAVENOUS at 15:12

## 2020-05-27 NOTE — NUR
FIBRINOGEN LEVEL 100-DR DEWITT MADE AWARE WITH STANDING ORDER OF TRANSFUSING 10 UNITS 
CRYOPRECIPITATE IF FIBRINOGEN LEVEL IS BELOW 150.

## 2020-05-27 NOTE — NUR
Started transfusing first bag of cryoprecipitate 5 units infusing well with stable v/s.will 
continue to monitor.

## 2020-05-27 NOTE — NUR
PT REFUSED HER PM MEDS INSPITE OF EXPLAINING ITS RISKS AND BENEFITS.REFUSED URINE STRAIGHT 
CATH AS WELL.

## 2020-05-27 NOTE — NUR
COMPLETED 1ST UNIT CRYOPRECIPITATE (5 UNITS) WITH NO ADVERSE REACTIONS. PT DENIES ANY PAIN 
OR DISTRESS.WITH STABLE V/S. WILL CONTINUE TO MONITOR.

## 2020-05-27 NOTE — NUR
PATIENT WASHED; REFUSED STRAIGHT CATH FOR URINE COLLECTION.

attempted to straight cath patient patient refused. states "I don't want to do that now.! No 
way!"

## 2020-05-27 NOTE — NUR
SCANNED THE BAR CODE OF FIRST UNIT CRYOPRECIPITATE AND IT SAYS "SCANNED PRODUCT DOES NOT 
MATCH SELECTED UNIT" NOTIFIED BLOOD BANK AND SPOKE TO LULU OF BLOOD BANK AND [facility] J.G. ink 
STATED THAT ITS OK TO ADMINISTER AND THEY ARE AWARE THAT THE BAR CODE WON'T SCAN BEC ITS A 
CRYOPRECIPITATE-WITNESSED BY CO-RNYASMEEN AND ADMINISTERED.

## 2020-05-27 NOTE — NUR
STARTED TRANSFUSING 2ND  UNIT CRYOPRECIPITATE (5 UNITS) WILL MONITOR FOR ADVERSE REACTIONS. 
PT DENIES ANY PAIN OR DISTRESS.WITH STABLE V/S. WILL CONTINUE TO MONITOR.

## 2020-05-27 NOTE — NUR
COMPLETED TRANSFUSING 2ND BAG OF CRYOPRECIPITATE (5 UNITS) WITH STABLE V/S AND NO ADVERSE 
REACTIONS NOTED. PT DENIES ANY PAIN OR DISTRESS.WILL CONTINUE TO  MONITOR.CALL LIGHT PLACED 
WITHIN REACH.

## 2020-05-27 NOTE — NUR
PT REFUSED TO BE TURNED AND REPOSITIONED FOR SKIN CARE MANAGEMENT INSPITE OF EXPLAINING ITS 
RISKS AND BENEFITS.PT IS UPSET AND KEEPS SCREAMING SAYING THAT SHE IS ALWAYS HOOKED ON IV 
FLUIDS AND IV ATBS AND EXPLAINED THE REASON EACH TIME AN IV ATB,PROCEDURE OR 
CRYOPREPCIPITATE BUT PT SCREAMS SAYING THAT SHE IS TIRED OF IT ALL.

## 2020-05-27 NOTE — NUR
pt refused Bactroban ointment and has ongoing Azactam IV atb infusion ongoing delaying 
Magnesium IV infusion.

## 2020-05-27 NOTE — NUR
MS RN NOTES



ALERT AND ORIENTED X4. RESTING COMFORTABLY WATCHING TV IN BED. NO SOB. HOB ELEVATED. DENIES 
ANY C/O PAIN NOR DISCOMFORT AT THIS TIME. WITH WEST MIDLINE INTACT AND PATENT. BED IN LOWEST 
POSITION , LOCKED. BED ALARM ON. CALL LIGHT WITHIN REACH. ABLE TO VERBALIZE NEEDS.PT REFUSED 
BACTROBAN OINTMENT AND REFUSED TO HAVE URINE COLLECTED THROUGH STRAIGHT CATH INSPITE OF 
EXPLAINING THE RISKS AND BENEFITS.PT INSISTS TO REFUSE.

## 2020-05-27 NOTE — NUR
Pt refused Potassium pill and urine to collect for UA inspite of explaining the risks and 
benefits.

## 2020-05-28 VITALS — DIASTOLIC BLOOD PRESSURE: 64 MMHG | SYSTOLIC BLOOD PRESSURE: 132 MMHG

## 2020-05-28 VITALS — SYSTOLIC BLOOD PRESSURE: 135 MMHG | DIASTOLIC BLOOD PRESSURE: 78 MMHG

## 2020-05-28 VITALS — DIASTOLIC BLOOD PRESSURE: 77 MMHG | SYSTOLIC BLOOD PRESSURE: 137 MMHG

## 2020-05-28 VITALS — SYSTOLIC BLOOD PRESSURE: 138 MMHG | DIASTOLIC BLOOD PRESSURE: 76 MMHG

## 2020-05-28 LAB
ALBUMIN SERPL BCP-MCNC: 3.1 G/DL (ref 3.4–5)
ALP SERPL-CCNC: 61 U/L (ref 46–116)
ALT SERPL W P-5'-P-CCNC: 29 U/L (ref 12–78)
AST SERPL W P-5'-P-CCNC: 18 U/L (ref 15–37)
BASOPHILS # BLD AUTO: 0 /CMM (ref 0–0.2)
BASOPHILS NFR BLD AUTO: 0.3 % (ref 0–2)
BILIRUB SERPL-MCNC: 0.8 MG/DL (ref 0.2–1)
BUN SERPL-MCNC: 6 MG/DL (ref 7–18)
CALCIUM SERPL-MCNC: 8.4 MG/DL (ref 8.5–10.1)
CHLORIDE SERPL-SCNC: 109 MMOL/L (ref 98–107)
CO2 SERPL-SCNC: 26 MMOL/L (ref 21–32)
CREAT SERPL-MCNC: 0.6 MG/DL (ref 0.6–1.3)
D DIMER PPP FEU-MCNC: 2.92 MG/L(FEU (ref 0.17–0.5)
EOSINOPHIL NFR BLD AUTO: 1.3 % (ref 0–6)
GLUCOSE SERPL-MCNC: 147 MG/DL (ref 74–106)
HCT VFR BLD AUTO: 31 % (ref 33–45)
HGB BLD-MCNC: 9.9 G/DL (ref 11.5–14.8)
LYMPHOCYTES NFR BLD AUTO: 0.2 /CMM (ref 0.8–4.8)
LYMPHOCYTES NFR BLD AUTO: 7.6 % (ref 20–44)
LYMPHOCYTES NFR BLD MANUAL: 10 % (ref 16–48)
MAGNESIUM SERPL-MCNC: 1.9 MG/DL (ref 1.8–2.4)
MCHC RBC AUTO-ENTMCNC: 32 G/DL (ref 31–36)
MCV RBC AUTO: 86 FL (ref 82–100)
MONOCYTES NFR BLD AUTO: 0.2 /CMM (ref 0.1–1.3)
MONOCYTES NFR BLD AUTO: 8.4 % (ref 2–12)
MONOCYTES NFR BLD MANUAL: 2 % (ref 0–11)
NEUTROPHILS # BLD AUTO: 2.1 /CMM (ref 1.8–8.9)
NEUTROPHILS NFR BLD AUTO: 82.4 % (ref 43–81)
NEUTS BAND NFR BLD MANUAL: 1 % (ref 0–5)
NEUTS SEG NFR BLD MANUAL: 87 % (ref 42–76)
PHOSPHATE SERPL-MCNC: 2.7 MG/DL (ref 2.5–4.9)
PLATELET # BLD AUTO: 18 /CMM (ref 150–450)
PLATELET # BLD AUTO: 18 /CMM (ref 150–450)
POTASSIUM SERPL-SCNC: 3.2 MMOL/L (ref 3.5–5.1)
PROT SERPL-MCNC: 5.7 G/DL (ref 6.4–8.2)
RBC # BLD AUTO: 3.6 MIL/UL (ref 4–5.2)
SODIUM SERPL-SCNC: 144 MMOL/L (ref 136–145)
WBC NRBC COR # BLD AUTO: 2.5 K/UL (ref 4.3–11)

## 2020-05-28 RX ADMIN — Medication PRN MG: at 17:25

## 2020-05-28 RX ADMIN — Medication SCH MG: at 08:22

## 2020-05-28 RX ADMIN — FOLIC ACID SCH MG: 1 TABLET ORAL at 08:22

## 2020-05-28 RX ADMIN — MUPIROCIN SCH APP: 20 OINTMENT TOPICAL at 20:54

## 2020-05-28 RX ADMIN — BACLOFEN PRN MG: 10 TABLET ORAL at 22:03

## 2020-05-28 RX ADMIN — Medication SCH MG: at 08:35

## 2020-05-28 RX ADMIN — Medication PRN MG: at 05:10

## 2020-05-28 RX ADMIN — DEXTROSE MONOHYDRATE SCH MLS/HR: 50 INJECTION, SOLUTION INTRAVENOUS at 13:41

## 2020-05-28 RX ADMIN — DEXTROSE MONOHYDRATE SCH MLS/HR: 50 INJECTION, SOLUTION INTRAVENOUS at 05:11

## 2020-05-28 RX ADMIN — PROPRANOLOL HYDROCHLORIDE SCH MG: 40 TABLET ORAL at 20:54

## 2020-05-28 RX ADMIN — DEXTROSE MONOHYDRATE SCH MLS/HR: 50 INJECTION, SOLUTION INTRAVENOUS at 22:03

## 2020-05-28 RX ADMIN — PROPRANOLOL HYDROCHLORIDE SCH MG: 40 TABLET ORAL at 05:11

## 2020-05-28 RX ADMIN — ALLOPURINOL SCH MG: 100 TABLET ORAL at 17:00

## 2020-05-28 RX ADMIN — ALLOPURINOL SCH MG: 100 TABLET ORAL at 08:35

## 2020-05-28 RX ADMIN — BACLOFEN PRN MG: 10 TABLET ORAL at 12:55

## 2020-05-28 RX ADMIN — ALLOPURINOL SCH MG: 100 TABLET ORAL at 12:46

## 2020-05-28 RX ADMIN — Medication PRN MG: at 01:07

## 2020-05-28 RX ADMIN — PROPRANOLOL HYDROCHLORIDE SCH MG: 40 TABLET ORAL at 12:46

## 2020-05-28 RX ADMIN — TOPIRAMATE SCH MG: 100 TABLET, COATED ORAL at 17:30

## 2020-05-28 RX ADMIN — PANTOPRAZOLE SODIUM SCH MG: 40 TABLET, DELAYED RELEASE ORAL at 08:21

## 2020-05-28 RX ADMIN — AZTREONAM SCH MLS/HR: 2 INJECTION, POWDER, FOR SOLUTION INTRAMUSCULAR; INTRAVENOUS at 08:21

## 2020-05-28 RX ADMIN — AZTREONAM SCH MLS/HR: 2 INJECTION, POWDER, FOR SOLUTION INTRAMUSCULAR; INTRAVENOUS at 16:58

## 2020-05-28 RX ADMIN — Medication PRN MG: at 22:03

## 2020-05-28 RX ADMIN — DEXTROSE MONOHYDRATE SCH MLS/HR: 50 INJECTION, SOLUTION INTRAVENOUS at 22:00

## 2020-05-28 RX ADMIN — AZTREONAM SCH MLS/HR: 2 INJECTION, POWDER, FOR SOLUTION INTRAMUSCULAR; INTRAVENOUS at 00:13

## 2020-05-28 RX ADMIN — TBO-FILGRASTIM SCH MCG: 480 INJECTION, SOLUTION SUBCUTANEOUS at 12:46

## 2020-05-28 RX ADMIN — BACLOFEN PRN MG: 10 TABLET ORAL at 05:10

## 2020-05-28 RX ADMIN — Medication PRN MG: at 12:55

## 2020-05-28 RX ADMIN — MUPIROCIN SCH APP: 20 OINTMENT TOPICAL at 08:22

## 2020-05-28 NOTE — NUR
MS RN CLOSING NOTES

PATIENT AWAKE IN BED. A/OX4. ON 2L NC. NO S/S OF ACUTE RESPIRATORY DISTRESS OR C/O PAIN AT 
THIS TIME. CONTACT/DROPLET PRECAUTIONS IN PLACE FOR R/O COVID. MIDLINE PRESENT ON RIGHT 
UPPER ARM, HEP LOCKED; PATIENT C/O OF BURNING UPON FLUSHING; SLIGHT REDNESS PRESENT AROUND 
DRESSING; CHARGE NURSE MADE AWARE. SAFETY MEASURES IN PLACE AND PATIENT'S NEEDS MET. BED 
LOCKED, ALARM ON, SIDE RAILS RAISED, HOB ELEVATED, CALL LIGHT WITHIN REACH. WILL CONTINUE TO 
MONITOR. 

-------------------------------------------------------------------------------

Addendum: 05/29/20 at 0324 by ALISSA ZUNIGA RN

-------------------------------------------------------------------------------

MS RN OPENING NOTES

## 2020-05-28 NOTE — NUR
MS RN NOTES



AWAKE & RESPONSIVE. NOT IN ANY DISTRESS. NO SOB NOTED. DENIES ANY PAIN OR DISCOMFORT AT THIS 
TIME. WITH IV-ML PATENT & INTACT. AM CAR DONE. MONITORED ACCORDINGLY. CALL LIGHT WITHIN 
REACH. BED IN LOWEST POSITION. SR UP X 3 WITH BED ALARM ON FOR SAFETY. WILL ENDORSE TO NEXT 
SHIFT.

## 2020-05-28 NOTE — NUR
MS RN OPENING NOTES

Received Patient awake and resting in bed. A/O x 4. VS stable with no acute distress. 
Breathing even and unlabored on room air with no respiratory distress. Patient stated 
moderate generalized pain. Will intervene as ordered. WEST Midline clean, intact, patent and 
flushing well. Safety precautions in place. Bed locked and set to lowest position with side 
rails x 2 up. All needs rendered at this time. Call light within reach. Will continue to 
monitor.

## 2020-05-28 NOTE — NUR
MS RN NOTES

PATIENT REFUSED IVPB VANCOMYCIN. PATIENT STATED "I DON'T NEED ALL THAT ANTIBIOTICS. I DIDN'T 
GET THEM AT THE LAST HOSPITAL". EXPLAINED TO PATIENT NEED FOR MEDICATION; HOWEVER, PATIENT 
STILL REFUSES. ON CALL JAYASHREE VERDUGO, MADE AWARE.

## 2020-05-28 NOTE — NUR
MS RN CLOSING NOTES

Received Patient awake and resting in bed. A/O x 4. VS stable with no acute distress. 
Breathing even and unlabored on room air with no respiratory distress. Patient stated 
moderate generalized pain. Administered Morphine 2mg IVP at 1725. WEST Midline clean, intact, 
patent and flushing well. Safety precautions in place. Bed locked and set to lowest position 
with side rails x 3 up. All needs rendered at this time. Call light within reach. Will 
endorse plan of care to oncoming shift.

## 2020-05-28 NOTE — NUR
MS RN NOTES

Patient refusing COVID-19 Test at this time. Patient stated, "I already took the test on 
5/22! I do not need it anymore!" Explained the need for the retest. Patient still strongly 
refuses.

## 2020-05-29 VITALS — SYSTOLIC BLOOD PRESSURE: 134 MMHG | DIASTOLIC BLOOD PRESSURE: 70 MMHG

## 2020-05-29 VITALS — SYSTOLIC BLOOD PRESSURE: 127 MMHG | DIASTOLIC BLOOD PRESSURE: 89 MMHG

## 2020-05-29 VITALS — SYSTOLIC BLOOD PRESSURE: 130 MMHG | DIASTOLIC BLOOD PRESSURE: 77 MMHG

## 2020-05-29 LAB
BASOPHILS # BLD AUTO: 0 /CMM (ref 0–0.2)
BASOPHILS NFR BLD AUTO: 0.1 % (ref 0–2)
BUN SERPL-MCNC: 8 MG/DL (ref 7–18)
CALCIUM SERPL-MCNC: 8.4 MG/DL (ref 8.5–10.1)
CHLORIDE SERPL-SCNC: 109 MMOL/L (ref 98–107)
CO2 SERPL-SCNC: 20 MMOL/L (ref 21–32)
CREAT SERPL-MCNC: 0.6 MG/DL (ref 0.6–1.3)
D DIMER PPP FEU-MCNC: 2.95 MG/L(FEU (ref 0.17–0.5)
EOSINOPHIL NFR BLD AUTO: 1.3 % (ref 0–6)
GLUCOSE SERPL-MCNC: 121 MG/DL (ref 74–106)
HCT VFR BLD AUTO: 33 % (ref 33–45)
HGB BLD-MCNC: 10.6 G/DL (ref 11.5–14.8)
LYMPHOCYTES NFR BLD AUTO: 0.2 /CMM (ref 0.8–4.8)
LYMPHOCYTES NFR BLD AUTO: 7.5 % (ref 20–44)
MAGNESIUM SERPL-MCNC: 1.8 MG/DL (ref 1.8–2.4)
MCHC RBC AUTO-ENTMCNC: 32 G/DL (ref 31–36)
MCV RBC AUTO: 87 FL (ref 82–100)
MONOCYTES NFR BLD AUTO: 0.3 /CMM (ref 0.1–1.3)
MONOCYTES NFR BLD AUTO: 9.3 % (ref 2–12)
NEUTROPHILS # BLD AUTO: 2.7 /CMM (ref 1.8–8.9)
NEUTROPHILS NFR BLD AUTO: 81.8 % (ref 43–81)
PHOSPHATE SERPL-MCNC: 2.8 MG/DL (ref 2.5–4.9)
PLATELET # BLD AUTO: 27 /CMM (ref 150–450)
PLATELET # BLD AUTO: 27 /CMM (ref 150–450)
POTASSIUM SERPL-SCNC: 3.6 MMOL/L (ref 3.5–5.1)
RBC # BLD AUTO: 3.8 MIL/UL (ref 4–5.2)
SODIUM SERPL-SCNC: 141 MMOL/L (ref 136–145)
WBC NRBC COR # BLD AUTO: 3.3 K/UL (ref 4.3–11)

## 2020-05-29 PROCEDURE — 05HB33Z INSERTION OF INFUSION DEVICE INTO RIGHT BASILIC VEIN, PERCUTANEOUS APPROACH: ICD-10-PCS | Performed by: NURSE PRACTITIONER

## 2020-05-29 RX ADMIN — DEXTROSE MONOHYDRATE SCH MLS/HR: 50 INJECTION, SOLUTION INTRAVENOUS at 22:00

## 2020-05-29 RX ADMIN — AZTREONAM SCH MLS/HR: 2 INJECTION, POWDER, FOR SOLUTION INTRAMUSCULAR; INTRAVENOUS at 00:00

## 2020-05-29 RX ADMIN — FOLIC ACID SCH MG: 1 TABLET ORAL at 09:00

## 2020-05-29 RX ADMIN — ALLOPURINOL SCH MG: 100 TABLET ORAL at 17:00

## 2020-05-29 RX ADMIN — Medication PRN MG: at 22:41

## 2020-05-29 RX ADMIN — AZTREONAM SCH MLS/HR: 2 INJECTION, POWDER, FOR SOLUTION INTRAMUSCULAR; INTRAVENOUS at 17:17

## 2020-05-29 RX ADMIN — BACLOFEN PRN MG: 10 TABLET ORAL at 22:40

## 2020-05-29 RX ADMIN — PROPRANOLOL HYDROCHLORIDE SCH MG: 40 TABLET ORAL at 13:00

## 2020-05-29 RX ADMIN — MUPIROCIN SCH APP: 20 OINTMENT TOPICAL at 08:19

## 2020-05-29 RX ADMIN — Medication SCH MG: at 08:19

## 2020-05-29 RX ADMIN — PROPRANOLOL HYDROCHLORIDE SCH MG: 40 TABLET ORAL at 05:00

## 2020-05-29 RX ADMIN — PROPRANOLOL HYDROCHLORIDE SCH MG: 40 TABLET ORAL at 21:00

## 2020-05-29 RX ADMIN — ALLOPURINOL SCH MG: 100 TABLET ORAL at 13:00

## 2020-05-29 RX ADMIN — MUPIROCIN SCH APP: 20 OINTMENT TOPICAL at 21:00

## 2020-05-29 RX ADMIN — TOPIRAMATE SCH MG: 100 TABLET, COATED ORAL at 18:00

## 2020-05-29 RX ADMIN — FOLIC ACID SCH MG: 1 TABLET ORAL at 08:15

## 2020-05-29 RX ADMIN — Medication SCH MG: at 08:16

## 2020-05-29 RX ADMIN — PANTOPRAZOLE SODIUM SCH MG: 40 TABLET, DELAYED RELEASE ORAL at 08:00

## 2020-05-29 RX ADMIN — ALLOPURINOL SCH MG: 100 TABLET ORAL at 08:17

## 2020-05-29 RX ADMIN — DEXTROSE MONOHYDRATE SCH MLS/HR: 50 INJECTION, SOLUTION INTRAVENOUS at 14:30

## 2020-05-29 RX ADMIN — ALLOPURINOL SCH MG: 100 TABLET ORAL at 09:00

## 2020-05-29 RX ADMIN — FENTANYL SCH MCG: 12 PATCH TRANSDERMAL at 18:00

## 2020-05-29 RX ADMIN — Medication SCH MG: at 09:00

## 2020-05-29 RX ADMIN — AZTREONAM SCH MLS/HR: 2 INJECTION, POWDER, FOR SOLUTION INTRAMUSCULAR; INTRAVENOUS at 09:00

## 2020-05-29 RX ADMIN — DEXTROSE MONOHYDRATE SCH MLS/HR: 50 INJECTION, SOLUTION INTRAVENOUS at 06:00

## 2020-05-29 RX ADMIN — PANTOPRAZOLE SODIUM SCH MG: 40 TABLET, DELAYED RELEASE ORAL at 08:15

## 2020-05-29 RX ADMIN — AZTREONAM SCH MLS/HR: 2 INJECTION, POWDER, FOR SOLUTION INTRAMUSCULAR; INTRAVENOUS at 08:18

## 2020-05-29 NOTE — NUR
MS RN OPENING NOTE



RECEIVED PATIENT IN BED, AWAKE, ALERT AND ORIENTED X4. NO CARDIAC OR RESPIRATORY DISTRESS 
NOTED. NO SOB NOTED. SATURATING AT 95% ON ROOM AIR. ON CONTACT AND DROPLET PRECAUTIONS FOR 
MRSA OF THE NARES AND PENDING COVID RESULTS. IV ACCESS NOTED ON R UPPER ARM MIDLINE. PER 
NIGHT SHIFT NURSE, PT WAS COMPLAINING OF BURNING SENSATION ON THE MIDLINE SITE. I DID NOTICE 
SOME REDNESS AROUND THE INSERTION SITE AND IT LOOKS TENDER. HOWEVER,  WHEN I ASKED PT IF SHE 
WOULD ALLOW ME TO LOOK AT IT, OR TO CHECK AND SEE IF IT IS WORKING. BUT SHE REFUSED. PT DOES 
HAVE SOME HOSTILE BEHAVIOR AND YELLS AND SCREAMS EVEN THOUGH NURSE WAS JUST ASKING HER SOME 
QUESTIONS, SHE STATED, "DO I REALLY HAVE TO KEEP REPEATING MYSELF?! NO! YOU CANNOT LOOK AT 
IT, BECAUSE I'M ALREADY TELLING YOU WHAT THE PROBLEM IS!!!" EXPLAINED THE IMPORTANCE OF 
HAVING AN RN ASSESS THE SITE, BUT SHE STILL REFUSED. STILL AWAITING FOR THE PICC LINE/ 
MIDLINE NURSE TO COME TO THE UNIT AND CHECK/ASSESS THE SITE. WILL FOLLOW UP. SAFETY MEASURES 
IN PLACE BED LOCKED AND IN LOW POSITION. BED ALARM ON, SIDE RAILS UP, HOB ELEVATED, CALL 
LIGHT WITHIN REACH. WILL CONT TO MONITOR.

## 2020-05-29 NOTE — NUR
MS LOVELACE OPENING NOTES

PATIENT AWAKE IN BED. A/OX4. ON 2L NC. NO S/S OF SOB OR C/O PAIN AT THIS TIME. IV PRESENT ON 
RIGHT FOREARM, SIZE 20, INTACT & PATENT, HEP LOCKED. SAFETY MEASURES IN PLACE AND PATIENT'S 
NEEDS MET. BED LOCKED, ALARM ON, SIDE RAILS X2, CALL LIGHT WITHIN REACH. WILL CONTINUE TO 
MONITOR. 

-------------------------------------------------------------------------------

Addendum: 05/30/20 at 0817 by ALISSA ZUNIGA RN

-------------------------------------------------------------------------------

WRONG TIME

## 2020-05-29 NOTE — NUR
MS RN CLOSING NOTES

PATIENT SLEEPING IN BED, EASY TO AWAKEN. A/OX4. CURRENTLY ON RA. NO S/S OF ACUTE RESPIRATORY 
DISTRESS OR C/O PAIN AT THIS TIME. CONTACT/DROPLET PRECAUTIONS REMAIN IN PLACE FOR R/O 
COVID. MIDLINE PRESENT ON RIGHT UPPER ARM, HEP LOCKED; PHOTOS TAKEN TO DOCUMENT REDNESS 
AROUND DRESSING; MIDLINE NURSE CONTACTED TO ASSESS. SAFETY MEASURES IN PLACE AND PATIENT'S 
NEEDS MET. BED LOCKED, ALARM ON, SIDE RAILS RAISED, HOB ELEVATED, CALL LIGHT WITHIN REACH. 
WILL ENDORSE PLAN OF CARE TO DAY SHIFT NURSE.

## 2020-05-29 NOTE — NUR
REFUSED MEDS



REFUSED ALL PO AND IV MEDS



 PT ALSO REFUSED PO MEDS AGAIN. REFUSED PO INDERAL AND ZYLOPRIN. BP NOTED /82 AND HR 
AT 88S. SHE STILL SAYS THAT SHE HAS PROBLEMS SWALLOWING, HOWEVER, SHE WAS ABLE TO EAT HER 
BREAKFAST AND DRINK WITHOUT ANY PROBLEM WHATSOEVER. NO S/S OF ASPIRATION NOTED. ONCE AGAIN, 
I OFFERED TO CRUSH HER MEDS AND PUT IT IN APPLE SAUCE OR JUICE, BUT SHE STILL REFUSED AND 
TATIANA, "WILL YOU JUST PLEASE FUCKING STOP ASKING ME ABOUT IT. I ALREADY TOLD YOU KNOW." 
EXPLAINED RISKS CONSEQUENCES AND NEGATIVE OUTCOMES OF NON COMPLIANT BEHAVIORS. STILL 
REFUSED.

## 2020-05-29 NOTE — NUR
REFUSED ALL PO AND IV MEDS



PT REFUSED ALL AM MED SUCH AS PROTONIX PO, BACTROBAN OINTMENT, ALLEGRA PO, FOLIC ACID PO, 
LOTENSIN PO, ZYLOPRIM PO AND IV AZACTAM ATB. PT STATED, "IM NOT GONNA LET YOU TOUCH MY IV. 
BECAUSE I ALREADY TOLD YOU THAT ITS NOT IN THE RIGHT PLACE. .SO YOU ARE NOT GOING TO GIVE ME 
THOSE IV ANTIBIOTICS!" I ASKED HER IF I COULD CHECK IT FOR MY SELF AND SEE IF HER R UPPER 
ARM MIDLINE IS PATENT, SHE STILL REFUSED. SHE SAID, "YOU ARE NOT TOUCHING OR FLUSHING IT. I 
JUST WANT IT REPLACED!" PT ALSO REFUSED PO MEDS, SHE SAID THAT SHE HAS PROBLEMS SWALLOWING, 
HOWEVER, SHE WAS ABLE TO EAT HER BREAKFAST AND DRINK WITHOUT ANY PROBLEM WHATSOEVER. NO S/S 
OF ASPIRATION NOTED. I OFFERED PT THAT I CAN CRUSH HER MEDS AND PUT IT IN APPLE SAUCE OR 
JUICE, BUT SHE REPLIED, "GOD DAMN! DONT YOU FUCKING LISTEN?! I SAID I DONT WANT IT!"

-------------------------------------------------------------------------------

Addendum: 05/29/20 at 1518 by KE COLLINS RN

-------------------------------------------------------------------------------

REFUSED ALL PO AND IV MEDS



PT REFUSED ALL AM MED SUCH AS PROTONIX PO, BACTROBAN OINTMENT, ALLEGRA PO, FOLIC ACID PO, 
LOTENSIN PO, ZYLOPRIM PO AND IV AZACTAM ATB. PT STATED, "IM NOT GONNA LET YOU TOUCH MY IV. 
BECAUSE I ALREADY TOLD YOU THAT ITS NOT IN THE RIGHT PLACE. .SO YOU ARE NOT GOING TO GIVE ME 
THOSE IV ANTIBIOTICS!" I ASKED HER IF I COULD CHECK IT FOR MY SELF AND SEE IF HER R UPPER 
ARM MIDLINE IS PATENT, SHE STILL REFUSED. SHE SAID, "YOU ARE NOT TOUCHING OR FLUSHING IT. I 
JUST WANT IT REPLACED!" PT ALSO REFUSED PO MEDS, SHE SAID THAT SHE HAS PROBLEMS SWALLOWING, 
HOWEVER, SHE WAS ABLE TO EAT HER BREAKFAST AND DRINK WITHOUT ANY PROBLEM WHATSOEVER. NO S/S 
OF ASPIRATION NOTED. I OFFERED PT THAT I CAN CRUSH HER MEDS AND PUT IT IN APPLE SAUCE OR 
JUICE, BUT SHE REPLIED, "GOD DAMN! DONT YOU FUCKING LISTEN?! I SAID I DONT WANT IT!" THERE 
IS ALSO AN IV ACCESS SITE ON THE L AC, BUT SHE ALSO REFUSES FOR ME TO CHECK FOR PATENCY 
AND/OR ADMINISTER IV ON THAT IV SITE.

## 2020-05-29 NOTE — NUR
MS RN NOTES - PATIENT REFUSING TRANSFUSION

EXPLAINED TO PATIENT THAT DR. DEWITT ORDERED A CRYOPRECIPITATE TRANSFUSION, HOWEVER PATIENT IS 
CURRENTLY REFUSING. PATIENT STATES "I'M NOT GETTING THAT TRANSFUSION. IT'S THE HOSPITAL'S 
FAULT I NEED THE TRANSFUSION BECAUSE YOU KEEP TAKING MY BLOOD". EXPLAINED TO PATIENT THAT 
FIBRINOGEN LEVELS ARE LOW WHICH IS WHY SHE NEEDS THE TRANSFUSION, HOWEVER PATIENT IS STILL 
REFUSING AT THIS TIME.

## 2020-05-29 NOTE — NUR
MS RN NOTES

NEW PERIPHERAL IV STARTED ON LEFT UPPER ARM, SIZE 22. PATIENT C/O BURNING UPON FLUSHING. 
PATIENT IS REFUSING ALL IVPB ANTIBIOTICS. ON CALL JAYASHREE VERDUGO, MADE AWARE.

## 2020-05-29 NOTE — NUR
MS RN OPENING NOTES

PATIENT AWAKE IN BED. A/OX4. ON 2L NC. NO S/S OF SOB OR C/O PAIN AT THIS TIME. IV PRESENT ON 
RIGHT FOREARM, SIZE 20, INTACT & PATENT, HEP LOCKED. SAFETY MEASURES IN PLACE AND PATIENT'S 
NEEDS MET. BED LOCKED, ALARM ON, SIDE RAILS X2, CALL LIGHT WITHIN REACH. WILL CONTINUE TO 
MONITOR.

## 2020-05-29 NOTE — NUR
D/C MIDLINE/IV RE-INSERTED



MIDLINE WAS DISCONTINUED. PT DID NOT WANT ANY OF THE NURSING STAFF TO CHECK HER MIDLINE FOR 
PATENCY OR TO CHECK THE SITE. SHE SAID, "I JUST WANT IT GONE. THERES SOMETHING WRONG WITH 
IT. I KNOW MY BODY. AND I KNOW IT JUST NEEDS TO BE TAKEN OUT." R UPPER ARM MIDLINE WAS 
EREMOVED. APPLIED PRESSURE DRESSING. NO S/S OF BLEEDING OR INFECTION TO SITE NOTED. PICCLINE 
NURSE WAS HERE AND WAS UNABLE TO RE-INSERT A NEW MIDLINE. HOWEVER, SHE WAS ABLE TO RE-INSERT 
A NEW PERIPHERAL LINE ON THE R FOREARM G20. INTACT, PATENT AND FLUSHING WELL. NO S/S OF 
INFECTION/INFILTRATION NOTED. TOLERATED PROCEDURE WELL.

## 2020-05-29 NOTE — NUR
REFUSED VANCOMYCIN



PT REFUSED VANCOMYCIN. SHE STATES, THAT SHE DOESNT NEED OR WANT ANY OF THESE ANTIBIOTIC. 
EXPLAINED RISKS AND BENEFITS OF MEDICATION AND EXPLAINED THE RISKS, CONSEQUENCES AND 
NEGATIVE OUTCOMES OF HER NON COMPLIANCE TO MEDICATIONS PRESCRIBES. BUT SHE SENT ME OUT OF 
HER ROOM AND SHE STILL REFUSED.

## 2020-05-29 NOTE — NUR
REFUSING CRYOPRECIPITATE



DR. DEWITT ORDERED FOR CRYOPRECIPITATE TRANSFUSION. I TRIED TO OBTAIN CONSENT FROM THE PT, BUT 
SHE IS REFUSING TO GIVE CONSENT. EXPLAINED RISKS AND BENEFITS OF THE TRANSFUSION, HOWEVER 
SHE IS STILL STRONGLY REFUSING.

## 2020-05-29 NOTE — NUR
MS RN NOTES

PATIENT REFUSING CRYOPRECIPITATE TRANSFUSION. PATIENT STATES "I DON'T WANT IT. STOP ASKING 
ME".

## 2020-05-29 NOTE — NUR
REFUSED ALL PO AND IV MEDS



PT REFUSED ALL AM MEDS AGAIN. SHE STATES THAT SHE DOESNT WANT ANY OF IT AND SHE DOESNT NEED 
ANY OF IT. THEN AT FIRST, SHE AGREED TO LET ME GIVE HER IV AZACTAM DUE AT 1700, SO I HUNG 
THE BAG AND STARTED TO ADMINISTER AMY ANTIBIOTIC. THEN 30 MINUTES LATER, SHE CALLED ME INTO 
HER ROOM SCREAMING AND YELLING, TELLINIG ME TO TAKE IT OUT FABRICATING STORIES TELLING ME, 
"I NEVER TOLD YOU THAT I WANTED THE ANTIBIOTIC. WHY DID YOU GIVE IT TO ME?!" EXPLAINED TO PT 
THAT SHE HAD INITIALLY AGREED TO IT AND I ASKED IF SHE HAD FORGOTTEN OUT 
CONVERSATION/INTERACTION WITH EACH OTHER, THEN SHE REPLIED, "WELL, TAKE IT OFF BECAUSE I 
DONT WANT IT." EXPLAINED RISKS AND BENEFITS OF MEDS AND RISKS, CONSEQUENCES AND NEGATIVE 
OUTCOMES OF NON COMPLIANT BEHAVIORS. THE SHE RESPONDS WITH, "I DONT WANNA TALK ANYMORE!".

## 2020-05-29 NOTE — NUR
MS RN CLOSING NOTES



PATIENT IN BED, AWAKE, ALERT AND ORIENTED X4. NO CARDIAC OR RESPIRATORY DISTRESS NOTED. NO 
SOB NOTED. SATURATING AT 95% ON ROOM AIR. ON CONTACT AND DROPLET PRECAUTIONS FOR MRSA OF THE 
NARES AND PENDING COVID RESULTS. IV ACCESS NOTED ON R FOREARM G22. INTACT AND PATENT AND 
FLUSHING WELL. NO S/S OF INFECTION OR INFILTRATION NOTED. PT REFUSED ALL HER MEDS AND 
TREATMENTS TODAY. SHE HAS DIFFERENT EXCUSES AS TO WHY SHE DOESNT WANNA TAKE HER MEDS AS 
PRESCRIBED. PROVIDED EDUCATION TO PT REGARDING RISKS, CONSEQUENCES AND NEGATIVE OUTCOMES OF 
NON COMPLIANT BEHAVIORS. BUT PT IS VERY STUBBORN AND ALWAYS INTERRUPTS NURSE WHEN TEACHING 
IS BEING PROVIDED. ALSO REFUSING CRYOPRECIPITATE INFUSION. EVEN WHEN I ASKED HER WITH THE 
NIGHT SHIFT RN, SHE STILL REFUSES, STATING, "I TOLD YOU ALREADY! I DONT WANT IT!".  SAFETY 
PRECAUTIONS IN PLACE BED LOCKED AND IN LOW POSITION. BED ALARM ON, SIDE RAILS UP, HOB 
ELEVATED, CALL LIGHT WITHIN REACH. WILL CONT TO MONITOR.

## 2020-05-30 VITALS — DIASTOLIC BLOOD PRESSURE: 74 MMHG | SYSTOLIC BLOOD PRESSURE: 143 MMHG

## 2020-05-30 VITALS — DIASTOLIC BLOOD PRESSURE: 61 MMHG | SYSTOLIC BLOOD PRESSURE: 111 MMHG

## 2020-05-30 VITALS — DIASTOLIC BLOOD PRESSURE: 73 MMHG | SYSTOLIC BLOOD PRESSURE: 139 MMHG

## 2020-05-30 RX ADMIN — TOPIRAMATE SCH MG: 100 TABLET, COATED ORAL at 18:00

## 2020-05-30 RX ADMIN — PANTOPRAZOLE SODIUM SCH MG: 40 TABLET, DELAYED RELEASE ORAL at 07:30

## 2020-05-30 RX ADMIN — Medication SCH MG: at 09:00

## 2020-05-30 RX ADMIN — ALLOPURINOL SCH MG: 100 TABLET ORAL at 09:00

## 2020-05-30 RX ADMIN — ALLOPURINOL SCH MG: 100 TABLET ORAL at 13:00

## 2020-05-30 RX ADMIN — BACLOFEN PRN MG: 10 TABLET ORAL at 21:11

## 2020-05-30 RX ADMIN — Medication PRN MG: at 21:03

## 2020-05-30 RX ADMIN — AZTREONAM SCH MLS/HR: 2 INJECTION, POWDER, FOR SOLUTION INTRAMUSCULAR; INTRAVENOUS at 16:00

## 2020-05-30 RX ADMIN — MUPIROCIN SCH APP: 20 OINTMENT TOPICAL at 09:00

## 2020-05-30 RX ADMIN — FOLIC ACID SCH MG: 1 TABLET ORAL at 09:00

## 2020-05-30 RX ADMIN — PROPRANOLOL HYDROCHLORIDE SCH MG: 40 TABLET ORAL at 21:00

## 2020-05-30 RX ADMIN — AZTREONAM SCH MLS/HR: 2 INJECTION, POWDER, FOR SOLUTION INTRAMUSCULAR; INTRAVENOUS at 08:00

## 2020-05-30 RX ADMIN — PROPRANOLOL HYDROCHLORIDE SCH MG: 40 TABLET ORAL at 05:00

## 2020-05-30 RX ADMIN — Medication PRN MG: at 03:19

## 2020-05-30 RX ADMIN — DEXTROSE MONOHYDRATE SCH MLS/HR: 50 INJECTION, SOLUTION INTRAVENOUS at 22:00

## 2020-05-30 RX ADMIN — DEXTROSE MONOHYDRATE SCH MLS/HR: 50 INJECTION, SOLUTION INTRAVENOUS at 14:00

## 2020-05-30 RX ADMIN — MUPIROCIN SCH APP: 20 OINTMENT TOPICAL at 21:00

## 2020-05-30 RX ADMIN — ALLOPURINOL SCH MG: 100 TABLET ORAL at 17:00

## 2020-05-30 RX ADMIN — AZTREONAM SCH MLS/HR: 2 INJECTION, POWDER, FOR SOLUTION INTRAMUSCULAR; INTRAVENOUS at 00:00

## 2020-05-30 RX ADMIN — PROPRANOLOL HYDROCHLORIDE SCH MG: 40 TABLET ORAL at 13:00

## 2020-05-30 RX ADMIN — DEXTROSE MONOHYDRATE SCH MLS/HR: 50 INJECTION, SOLUTION INTRAVENOUS at 06:00

## 2020-05-30 NOTE — NUR
MS RN OPENING NOTES



RECEIVED PATIENT IN BED, AWAKE, A/O X4. PATIENT IS ON OXYGEN THERAPY AT 2 LPM VIA NASAL 
CANNULA. NO COMPLAINS OF PAIN. RFA IV ACCESS PRESENT AND INTACT. SAFETY PRECAUTIONS IN 
PLACE; BED IN LOW POSITION AND LOCKED, RAILS UP X2, CALL LIGHT WITHIN REACH. WILL CONTINUE 
TO MONITOR PATIENT.

## 2020-05-30 NOTE — NUR
MS RN NOTES

OFFERED BACTROBAN FOR MRSA NARES AND INDERAL FOR BLOOD PRESSURE BUT REFUSED.EXPLAINED RISK 
AND BENEFITS BUT STILL REFUSED.

## 2020-05-30 NOTE — NUR
MS RN NOTES



PATIENT TOTALLY NON-COMPLIANT. REFUSES LABS, VITALS, MEDS. ATTENDING PHYSICIAN AWARE.

## 2020-05-30 NOTE — NUR
MS RN NOTES



RESUMED PATIENT CARE. APTIENT ASLEEP AT THIS TIME, AROUSABLE TO VERBAL AND TACTILE STIMULI. 
CALL LIGHT WITHIN REACH.

## 2020-05-30 NOTE — NUR
MS RN NOTES

RECEIVED ON BED A/O X4,HOB ELEVATED.ON BARIATRIC BED WITH SPECIALTY MATTRESS.SALINE LOCK RFA 
INTACT AND PATENT.CALL LIGHT IN REACH,NEEDS ANTICIPATED.

## 2020-05-30 NOTE — NUR
MS RN NOTES



ALERT AND ORIENTED X4. PATIENT ON THE PHONE PEAKING WITH DR. DEWITT, YELLING AND VERY UPSET. 
PATIENT NON-COMPLIANT WITH MEDS AND REFUSED EVENING MEDICATIONS DESPITE OF EDUCATION 
PROVIDED. NO SOB. HOB ELEVATED. DENIES ANY C/O PAIN NOR DISCOMFORT AT THIS TIME. BED IN 
LOWEST POSITION , LOCKED. BED ALARM ON. CALL LIGHT WITHIN REACH. ABLE TO VERBALIZE NEEDS. IN 
NO APPARENT DISTRESS.

## 2020-05-30 NOTE — NUR
MS RN NOTES  PAIN MANAGEMENT

C/O PAIN ON HER BACK AND AND BACK OF HEAD,MEDICATED WITH MORPHINE 2MG IV AS ORDERED.

## 2020-05-30 NOTE — NUR
MS RN NOTES

REFUSED VANCOMYCIN IV,PATIENT SAYS " I DONT WANT TO BE HOOKED UP WITH THE IV FLUIDS AND 
ANTIBIOTICS"

## 2020-05-30 NOTE — NUR
MS RN CLOSING NOTES

PATIENT AWAKE IN BED. A/OX4. ON 2L NC. NO DISTRESS NOTED. IV ON RIGHT FOREARM REMAINS INTACT 
& PATENT. SAFETY MEASURES IN PLACE AND PATIENT'S NEEDS MET. ENDORSED TO DAY SHIFT NURSE PLAN 
OF CARE AND REFUSAL OF CRYOPRECIPITATE TRANSFUSION.

## 2020-05-31 VITALS — SYSTOLIC BLOOD PRESSURE: 135 MMHG | DIASTOLIC BLOOD PRESSURE: 67 MMHG

## 2020-05-31 RX ADMIN — TOPIRAMATE SCH MG: 100 TABLET, COATED ORAL at 17:54

## 2020-05-31 RX ADMIN — BACLOFEN PRN MG: 10 TABLET ORAL at 06:13

## 2020-05-31 RX ADMIN — MUPIROCIN SCH APP: 20 OINTMENT TOPICAL at 09:00

## 2020-05-31 RX ADMIN — Medication PRN MG: at 01:44

## 2020-05-31 RX ADMIN — AZTREONAM SCH MLS/HR: 2 INJECTION, POWDER, FOR SOLUTION INTRAMUSCULAR; INTRAVENOUS at 00:00

## 2020-05-31 RX ADMIN — Medication PRN MG: at 11:09

## 2020-05-31 RX ADMIN — DEXTROSE MONOHYDRATE SCH MLS/HR: 50 INJECTION, SOLUTION INTRAVENOUS at 06:00

## 2020-05-31 RX ADMIN — ALLOPURINOL SCH MG: 100 TABLET ORAL at 12:11

## 2020-05-31 RX ADMIN — DEXTROSE MONOHYDRATE SCH MLS/HR: 50 INJECTION, SOLUTION INTRAVENOUS at 14:00

## 2020-05-31 RX ADMIN — PANTOPRAZOLE SODIUM SCH MG: 40 TABLET, DELAYED RELEASE ORAL at 07:30

## 2020-05-31 RX ADMIN — BACLOFEN PRN MG: 10 TABLET ORAL at 12:13

## 2020-05-31 RX ADMIN — PROPRANOLOL HYDROCHLORIDE SCH MG: 40 TABLET ORAL at 05:00

## 2020-05-31 RX ADMIN — PROPRANOLOL HYDROCHLORIDE SCH MG: 40 TABLET ORAL at 12:11

## 2020-05-31 RX ADMIN — Medication PRN MG: at 06:13

## 2020-05-31 RX ADMIN — ALLOPURINOL SCH MG: 100 TABLET ORAL at 09:00

## 2020-05-31 RX ADMIN — Medication SCH MG: at 09:00

## 2020-05-31 RX ADMIN — ALLOPURINOL SCH MG: 100 TABLET ORAL at 17:00

## 2020-05-31 RX ADMIN — AZTREONAM SCH MLS/HR: 2 INJECTION, POWDER, FOR SOLUTION INTRAMUSCULAR; INTRAVENOUS at 08:00

## 2020-05-31 RX ADMIN — FOLIC ACID SCH MG: 1 TABLET ORAL at 09:00

## 2020-05-31 RX ADMIN — AZTREONAM SCH MLS/HR: 2 INJECTION, POWDER, FOR SOLUTION INTRAMUSCULAR; INTRAVENOUS at 16:00

## 2020-05-31 NOTE — NUR
MS RN NOTES  PAIN MANAGEMENT

C/O BACK PAIN 8/10 ON PAIN SCALE,MEDICATED WITH MORPHINE 2MG IV AS ORDERED.

## 2020-05-31 NOTE — NUR
RN NOTE



Medications left at bedside. Called [facility] and they stated they will come back to  
medications. Charge RN made aware.

## 2020-05-31 NOTE — NUR
RN NOTE 



Spoke with [place] and they stated that ALBANIA Savage from [facility] stated that patient's 
medications will not be accepted from us. Will keep meds at pharmacy.

## 2020-05-31 NOTE — NUR
MS RN NOTES

ON BED A/O X4,REFUSED ALL DUE PO AND IV ABX EXCEPT MORPHINE AND BACLOFEN.POSSIBLE TRANSFER 
TO [facility] ONCE COVID 19 TEST RESULTED.IN NO ACUTE DISTRESS.WILL ENDORSE TO DAY NURSE 
FOR TORSTEN.

## 2020-05-31 NOTE — NUR
RN DISCHARGE NOTE 



Patient is medically stable for discharge, report given to Jory LOVELACE at [facility]. Patient 
is A/O x4, showing no signs of acute distress or SOB, saturating >95% on RA, 3L NC PRN 
comfort. Patient refused skin assessment. DC instructions provided and patient verbalized 
understanding. All belongings with patient. Medications picked up from pharmacy. Patient 
will be leaving with IV line due to IV meds prescribed by MD after DC All patient needs met, 
all due medications given, patient kept clean and dry throughout shift. Patient picked up by 
EMS en route to [facility].

## 2020-05-31 NOTE — NUR
MS RN NOTES

REPOSITIONED,OFFERED  DUE AZACTAM IV ABX BUT REFUSED.EXPLAINED RISK AND BENEFITS BUT 
REFUSED.

## 2020-05-31 NOTE — NUR
RN OPENING NOTE 



Patient is resting in bed, A/O x4, showing no signs of acute distress or SOB, saturating 
>95% on 2L NC. Patient refused to have IV line flushed. Patient refused AM medications when 
asked. Patient stated, "I want to sleep, please leave me alone." Bed is in lowest position, 
side rails x3 in upright position, call light is within reach, fall, safety and aspirataion 
precautions enforced. Will continue with plan of care.

## 2020-06-02 LAB
CENTROMERE B AB SER-ACNC: <0.2
CHROMATIN AB SERPL-ACNC: <0.2
DSDNA AB SER-ACNC: <1 [IU]/ML
ENA JO1 AB SER-ACNC: <0.2
ENA RNP AB SER-ACNC: <0.2
ENA SCL70 AB SER-ACNC: <0.2
ENA SM AB SER-ACNC: <0.2
ENA SS-A AB SER-ACNC: <0.2
ENA SS-B AB SER-ACNC: <0.2